# Patient Record
Sex: FEMALE | Race: WHITE | NOT HISPANIC OR LATINO | Employment: FULL TIME | URBAN - METROPOLITAN AREA
[De-identification: names, ages, dates, MRNs, and addresses within clinical notes are randomized per-mention and may not be internally consistent; named-entity substitution may affect disease eponyms.]

---

## 2017-06-15 DIAGNOSIS — F32.A DEPRESSION: ICD-10-CM

## 2017-06-15 RX ORDER — ESCITALOPRAM OXALATE 20 MG/1
TABLET ORAL
Qty: 135 TABLET | Refills: 0 | Status: SHIPPED | OUTPATIENT
Start: 2017-06-15 | End: 2017-09-25 | Stop reason: SDUPTHER

## 2017-06-15 NOTE — TELEPHONE ENCOUNTER
"Tried to inform pt about appt but she hung up on me, mid conversation, after asking me \"who I was and what I wanted.\"  "

## 2017-09-25 ENCOUNTER — OFFICE VISIT (OUTPATIENT)
Dept: FAMILY MEDICINE CLINIC | Facility: CLINIC | Age: 27
End: 2017-09-25

## 2017-09-25 VITALS
BODY MASS INDEX: 52.15 KG/M2 | WEIGHT: 283.4 LBS | TEMPERATURE: 97.2 F | SYSTOLIC BLOOD PRESSURE: 124 MMHG | HEART RATE: 76 BPM | DIASTOLIC BLOOD PRESSURE: 84 MMHG | RESPIRATION RATE: 18 BRPM | HEIGHT: 62 IN

## 2017-09-25 DIAGNOSIS — F33.42 RECURRENT MAJOR DEPRESSIVE DISORDER, IN FULL REMISSION (HCC): Primary | ICD-10-CM

## 2017-09-25 DIAGNOSIS — R63.5 WEIGHT GAIN: ICD-10-CM

## 2017-09-25 DIAGNOSIS — E28.2 PCOS (POLYCYSTIC OVARIAN SYNDROME): ICD-10-CM

## 2017-09-25 LAB
ALBUMIN SERPL-MCNC: 4.2 G/DL (ref 3.2–4.8)
ALBUMIN/GLOB SERPL: 1.6 G/DL (ref 1.5–2.5)
ALP SERPL-CCNC: 79 U/L (ref 25–100)
ALT SERPL-CCNC: 63 U/L (ref 7–40)
AST SERPL-CCNC: 35 U/L (ref 0–33)
BASOPHILS # BLD AUTO: 0.03 10*3/MM3 (ref 0–0.2)
BASOPHILS NFR BLD AUTO: 0.4 % (ref 0–1)
BILIRUB SERPL-MCNC: 0.3 MG/DL (ref 0.3–1.2)
BUN SERPL-MCNC: 9 MG/DL (ref 9–23)
BUN/CREAT SERPL: 12.9 (ref 7–25)
CALCIUM SERPL-MCNC: 9.1 MG/DL (ref 8.7–10.4)
CHLORIDE SERPL-SCNC: 106 MMOL/L (ref 99–109)
CO2 SERPL-SCNC: 26 MMOL/L (ref 20–31)
CREAT SERPL-MCNC: 0.7 MG/DL (ref 0.6–1.3)
EOSINOPHIL # BLD AUTO: 0.35 10*3/MM3 (ref 0–0.3)
EOSINOPHIL NFR BLD AUTO: 4.6 % (ref 0–3)
ERYTHROCYTE [DISTWIDTH] IN BLOOD BY AUTOMATED COUNT: 14.3 % (ref 11.3–14.5)
GLOBULIN SER CALC-MCNC: 2.7 GM/DL
GLUCOSE SERPL-MCNC: 135 MG/DL (ref 70–100)
HBA1C MFR BLD: 5.6 % (ref 4.8–5.6)
HCT VFR BLD AUTO: 41.9 % (ref 34.5–44)
HGB BLD-MCNC: 13.4 G/DL (ref 11.5–15.5)
IMM GRANULOCYTES # BLD: 0.04 10*3/MM3 (ref 0–0.03)
IMM GRANULOCYTES NFR BLD: 0.5 % (ref 0–0.6)
LYMPHOCYTES # BLD AUTO: 3.11 10*3/MM3 (ref 0.6–4.8)
LYMPHOCYTES NFR BLD AUTO: 40.5 % (ref 24–44)
MCH RBC QN AUTO: 28.5 PG (ref 27–31)
MCHC RBC AUTO-ENTMCNC: 32 G/DL (ref 32–36)
MCV RBC AUTO: 89 FL (ref 80–99)
MONOCYTES # BLD AUTO: 0.58 10*3/MM3 (ref 0–1)
MONOCYTES NFR BLD AUTO: 7.6 % (ref 0–12)
NEUTROPHILS # BLD AUTO: 3.56 10*3/MM3 (ref 1.5–8.3)
NEUTROPHILS NFR BLD AUTO: 46.4 % (ref 41–71)
PLATELET # BLD AUTO: 357 10*3/MM3 (ref 150–450)
POTASSIUM SERPL-SCNC: 3.8 MMOL/L (ref 3.5–5.5)
PROT SERPL-MCNC: 6.9 G/DL (ref 5.7–8.2)
RBC # BLD AUTO: 4.71 10*6/MM3 (ref 3.89–5.14)
SODIUM SERPL-SCNC: 139 MMOL/L (ref 132–146)
TSH SERPL DL<=0.005 MIU/L-ACNC: 4.11 MIU/ML (ref 0.35–5.35)
WBC # BLD AUTO: 7.67 10*3/MM3 (ref 3.5–10.8)

## 2017-09-25 PROCEDURE — 99214 OFFICE O/P EST MOD 30 MIN: CPT | Performed by: FAMILY MEDICINE

## 2017-09-25 PROCEDURE — 3008F BODY MASS INDEX DOCD: CPT | Performed by: FAMILY MEDICINE

## 2017-09-25 RX ORDER — ESCITALOPRAM OXALATE 20 MG/1
30 TABLET ORAL DAILY
Qty: 135 TABLET | Refills: 1 | Status: SHIPPED | OUTPATIENT
Start: 2017-09-25 | End: 2017-11-16

## 2017-09-25 NOTE — PROGRESS NOTES
Subjective   Nicole Jo is a 27 y.o. female.     History of Present Illness     Her mood has been doing well  She is using lexapro as well as THC on regular basis which has greatly enhanced her mood  This has been helpful for her and happy with where she is at right now    She forgot to take her to metformin and would lik to get back on her metformin.  Has PCOS  She has been feeling somewhat shaky if she does not eat anything.  This is better with eating  Pt would like labs to make sure she has not developed DM at this time due to family history    She continues to gain weight  She is not sure why she has gained more weight  She is not dieting nor exercising but has also not changed her lifestyle and has gained 14 pounds since last visit.  Mood doing well and THC use could be an issue.    The following portions of the patient's history were reviewed and updated as appropriate: allergies, current medications, past family history, past medical history, past social history, past surgical history and problem list.    Review of Systems   Constitutional: Positive for unexpected weight change.   HENT: Negative.    Eyes: Negative.    Respiratory: Negative.    Cardiovascular: Negative.    Gastrointestinal: Negative.    Musculoskeletal: Negative.    Skin: Negative.    Neurological: Negative.    Psychiatric/Behavioral: Negative.    All other systems reviewed and are negative.      Objective   Physical Exam   Constitutional: She is oriented to person, place, and time. She appears well-developed and well-nourished. No distress.   HENT:   Head: Normocephalic and atraumatic.   Right Ear: Tympanic membrane, external ear and ear canal normal.   Left Ear: Tympanic membrane, external ear and ear canal normal.   Nose: Nose normal.   Mouth/Throat: Uvula is midline and oropharynx is clear and moist.   Eyes: Conjunctivae and EOM are normal.   Neck: Normal range of motion. Neck supple. No thyromegaly present.   Cardiovascular: Normal  rate, regular rhythm and normal heart sounds.    No murmur heard.  Pulmonary/Chest: Effort normal and breath sounds normal. No respiratory distress.   Abdominal: Soft. Bowel sounds are normal. She exhibits no distension and no mass. There is no tenderness.   Lymphadenopathy:     She has no cervical adenopathy.   Neurological: She is alert and oriented to person, place, and time.   Skin: Skin is warm and dry.   Psychiatric: She has a normal mood and affect. Her behavior is normal. Judgment and thought content normal.   Nursing note and vitals reviewed.      Assessment/Plan   Nicole was seen today for polycystic ovary syndrome and depression.    Diagnoses and all orders for this visit:    Recurrent major depressive disorder, in full remission  -     escitalopram (LEXAPRO) 20 MG tablet; Take 1.5 tablets by mouth Daily.    PCOS (polycystic ovarian syndrome)  -     metFORMIN (GLUCOPHAGE) 500 MG tablet; Take 1 tablet by mouth Daily With Breakfast.  -     CBC & Differential  -     Comprehensive Metabolic Panel  -     Hemoglobin A1c    Weight gain  -     CBC & Differential  -     Comprehensive Metabolic Panel  -     Hemoglobin A1c  -     TSH    mood has been doing great.  I did tell her I could not condone illicit substance usage it has provided benefit for her mood, placebo or not.  This could be causing some weight issues.  Will check labs for the weight and continue lexapro unchanged.  Diet and exercise discussed for her weight  Ok to resume metformin for PCOS and will check labs.  F/u pending results

## 2017-11-16 ENCOUNTER — OFFICE VISIT (OUTPATIENT)
Dept: FAMILY MEDICINE CLINIC | Facility: CLINIC | Age: 27
End: 2017-11-16

## 2017-11-16 VITALS
WEIGHT: 288.5 LBS | HEART RATE: 76 BPM | TEMPERATURE: 97.6 F | HEIGHT: 62 IN | DIASTOLIC BLOOD PRESSURE: 72 MMHG | BODY MASS INDEX: 53.09 KG/M2 | RESPIRATION RATE: 18 BRPM | SYSTOLIC BLOOD PRESSURE: 116 MMHG

## 2017-11-16 DIAGNOSIS — F33.8 SEASONAL AFFECTIVE DISORDER (HCC): ICD-10-CM

## 2017-11-16 DIAGNOSIS — F33.42 RECURRENT MAJOR DEPRESSIVE DISORDER, IN FULL REMISSION (HCC): Primary | ICD-10-CM

## 2017-11-16 PROCEDURE — 99213 OFFICE O/P EST LOW 20 MIN: CPT | Performed by: FAMILY MEDICINE

## 2017-11-16 RX ORDER — CITALOPRAM 40 MG/1
40 TABLET ORAL DAILY
Qty: 90 TABLET | Refills: 1 | Status: SHIPPED | OUTPATIENT
Start: 2017-11-16 | End: 2018-02-27 | Stop reason: SDUPTHER

## 2017-11-16 NOTE — PROGRESS NOTES
"Subjective   Nicole Jo is a 27 y.o. female.     History of Present Illness   History of depression currently treated with Lexapro 30mg daily.   States that depression has worsened, typically worsens this time of year. Yesterday, she cried during her lunch and all night last night with no specific reason.  Lexapro improves symptoms, also smokes marijuana which improves depression more.   She denies suicidal thoughts, but \"the thought of not existing, but not dying, is attractive\". No plan or intent of suicide.   Previously treated with Abilify, but made her suicidal.   Took Zoloft as a child, however can't remember how effective it was.     The following portions of the patient's history were reviewed and updated as appropriate: allergies, current medications, past family history, past medical history, past social history, past surgical history and problem list.    Review of Systems   Constitutional: Negative.    Respiratory: Negative.    Cardiovascular: Negative.    Neurological: Negative.    Hematological: Negative.    Psychiatric/Behavioral: Positive for dysphoric mood. Negative for agitation, behavioral problems, self-injury and suicidal ideas. The patient is not nervous/anxious.        Objective   Physical Exam   Constitutional: She is oriented to person, place, and time. She appears well-developed and well-nourished.   HENT:   Head: Normocephalic and atraumatic.   Right Ear: External ear normal.   Left Ear: External ear normal.   Nose: Nose normal.   Eyes: Conjunctivae and EOM are normal.   Neck: Normal range of motion.   Cardiovascular: Normal rate, regular rhythm and normal heart sounds.    Pulmonary/Chest: Effort normal and breath sounds normal.   Musculoskeletal: She exhibits no deformity.   Neurological: She is alert and oriented to person, place, and time. No cranial nerve deficit.   Skin: Skin is warm and dry.   Psychiatric: Her speech is normal and behavior is normal. She exhibits a depressed mood. "   Flat affect   Nursing note and vitals reviewed.      Assessment/Plan   Nicole was seen today for depression.    Diagnoses and all orders for this visit:    Recurrent major depressive disorder, in full remission  -     citalopram (CELEXA) 40 MG tablet; Take 1 tablet by mouth Daily.    Seasonal affective disorder      Likely seasonal affective disorder causing the increase of depression.  Will change therapy to Celexa to see if it will improve symptoms better.  She will notify us of any side effects.  Advised her to avoid marijuana and other illicit substances.  She was instructed to go to the nearest ER if suicidal or homicidal thoughts did become present.

## 2017-11-16 NOTE — PATIENT INSTRUCTIONS
Go to the nearest ER or return to clinic if symptoms worsen, fever/chill develop  Major Depressive Disorder  Major depressive disorder is a mental illness. It also may be called clinical depression or unipolar depression. Major depressive disorder usually causes feelings of sadness, hopelessness, or helplessness. Some people with this disorder do not feel particularly sad but lose interest in doing things they used to enjoy (anhedonia). Major depressive disorder also can cause physical symptoms. It can interfere with work, school, relationships, and other normal everyday activities. The disorder varies in severity but is longer lasting and more serious than the sadness we all feel from time to time in our lives.  Major depressive disorder often is triggered by stressful life events or major life changes. Examples of these triggers include divorce, loss of your job or home, a move, and the death of a family member or close friend. Sometimes this disorder occurs for no obvious reason at all. People who have family members with major depressive disorder or bipolar disorder are at higher risk for developing this disorder, with or without life stressors. Major depressive disorder can occur at any age. It may occur just once in your life (single episode major depressive disorder). It may occur multiple times (recurrent major depressive disorder).  SYMPTOMS  People with major depressive disorder have either anhedonia or depressed mood on nearly a daily basis for at least 2 weeks or longer. Symptoms of depressed mood include:  · Feelings of sadness (blue or down in the dumps) or emptiness.  · Feelings of hopelessness or helplessness.  · Tearfulness or episodes of crying (may be observed by others).  · Irritability (children and adolescents).  In addition to depressed mood or anhedonia or both, people with this disorder have at least four of the following symptoms:  · Difficulty sleeping or sleeping too much.     · Significant change (increase or decrease) in appetite or weight.    · Lack of energy or motivation.  · Feelings of guilt and worthlessness.    · Difficulty concentrating, remembering, or making decisions.  · Unusually slow movement (psychomotor retardation) or restlessness (as observed by others).    · Recurrent wishes for death, recurrent thoughts of self-harm (suicide), or a suicide attempt.  People with major depressive disorder commonly have persistent negative thoughts about themselves, other people, and the world. People with severe major depressive disorder may experience distorted beliefs or perceptions about the world (psychotic delusions). They also may see or hear things that are not real (psychotic hallucinations).  DIAGNOSIS  Major depressive disorder is diagnosed through an assessment by your health care provider. Your health care provider will ask about aspects of your daily life, such as mood, sleep, and appetite, to see if you have the diagnostic symptoms of major depressive disorder. Your health care provider may ask about your medical history and use of alcohol or drugs, including prescription medicines. Your health care provider also may do a physical exam and blood work. This is because certain medical conditions and the use of certain substances can cause major depressive disorder-like symptoms (secondary depression). Your health care provider also may refer you to a mental health specialist for further evaluation and treatment.  TREATMENT  It is important to recognize the symptoms of major depressive disorder and seek treatment. The following treatments can be prescribed for this disorder:    · Medicine. Antidepressant medicines usually are prescribed. Antidepressant medicines are thought to correct chemical imbalances in the brain that are commonly associated with major depressive disorder. Other types of medicine may be added if the symptoms do not respond to antidepressant medicines  alone or if psychotic delusions or hallucinations occur.  · Talk therapy. Talk therapy can be helpful in treating major depressive disorder by providing support, education, and guidance. Certain types of talk therapy also can help with negative thinking (cognitive behavioral therapy) and with relationship issues that trigger this disorder (interpersonal therapy).  A mental health specialist can help determine which treatment is best for you. Most people with major depressive disorder do well with a combination of medicine and talk therapy. Treatments involving electrical stimulation of the brain can be used in situations with extremely severe symptoms or when medicine and talk therapy do not work over time. These treatments include electroconvulsive therapy, transcranial magnetic stimulation, and vagal nerve stimulation.     This information is not intended to replace advice given to you by your health care provider. Make sure you discuss any questions you have with your health care provider.     Document Released: 04/14/2014 Document Revised: 01/08/2016 Document Reviewed: 04/14/2014  [employer] Interactive Patient Education ©2017 [employer]

## 2018-02-27 ENCOUNTER — OFFICE VISIT (OUTPATIENT)
Dept: FAMILY MEDICINE CLINIC | Facility: CLINIC | Age: 28
End: 2018-02-27

## 2018-02-27 VITALS
DIASTOLIC BLOOD PRESSURE: 70 MMHG | HEART RATE: 72 BPM | BODY MASS INDEX: 50.88 KG/M2 | SYSTOLIC BLOOD PRESSURE: 114 MMHG | WEIGHT: 276.5 LBS | RESPIRATION RATE: 18 BRPM | HEIGHT: 62 IN | TEMPERATURE: 97.6 F

## 2018-02-27 DIAGNOSIS — E28.2 PCOS (POLYCYSTIC OVARIAN SYNDROME): ICD-10-CM

## 2018-02-27 DIAGNOSIS — F33.42 RECURRENT MAJOR DEPRESSIVE DISORDER, IN FULL REMISSION (HCC): ICD-10-CM

## 2018-02-27 PROCEDURE — 99214 OFFICE O/P EST MOD 30 MIN: CPT | Performed by: FAMILY MEDICINE

## 2018-02-27 RX ORDER — CITALOPRAM 40 MG/1
40 TABLET ORAL DAILY
Qty: 90 TABLET | Refills: 1 | Status: SHIPPED | OUTPATIENT
Start: 2018-02-27 | End: 2018-07-30 | Stop reason: SDUPTHER

## 2018-02-27 NOTE — PROGRESS NOTES
Subjective   Nicole Jo is a 27 y.o. female.     History of Present Illness     She has been doing better on the celexa instead of lexapro  Better libido on this medicine  And mood has been doing well  She does not feel down nor depressed and her mood is really well controlled    Pt with PCOS but she does forget the metformin often and asks that I send it to [facility] today  She is willing to resume it  She does not feel better nor worse on the meciein but she should be taking it for her PCOS and will resume    reviewed Pmhx, SocHx, Fam Hx, Surg Hx today    Review of Systems   Constitutional: Negative.  Negative for unexpected weight change.   HENT: Negative.    Eyes: Negative.  Negative for visual disturbance.   Respiratory: Negative.  Negative for shortness of breath.    Cardiovascular: Negative.  Negative for chest pain.   Gastrointestinal: Negative.  Negative for constipation and diarrhea.   Genitourinary: Negative.  Negative for menstrual problem.   Musculoskeletal: Negative.    Skin: Negative.    Neurological: Negative.    Psychiatric/Behavioral: Negative.  Negative for dysphoric mood. The patient is not nervous/anxious.    All other systems reviewed and are negative.      Objective   Physical Exam   Constitutional: She is oriented to person, place, and time. She appears well-developed and well-nourished. No distress.   HENT:   Head: Normocephalic and atraumatic.   Cardiovascular: Normal rate, regular rhythm and normal heart sounds.    Pulmonary/Chest: Effort normal and breath sounds normal.   Neurological: She is alert and oriented to person, place, and time.   Psychiatric: She has a normal mood and affect. Her behavior is normal. Judgment and thought content normal.   Nursing note and vitals reviewed.      Assessment/Plan   Nicole was seen today for depression.    Diagnoses and all orders for this visit:    Recurrent major depressive disorder, in full remission  -     citalopram (CELEXA) 40 MG tablet; Take 1  tablet by mouth Daily.    PCOS (polycystic ovarian syndrome)  -     metFORMIN (GLUCOPHAGE) 500 MG tablet; Take 1 tablet by mouth Daily With Breakfast.    she really is doing quite well, happy with medicine and things at home are going well  Refilled celexa  She has not been taking the metformin, will refill it and she will resume.  On this for PCOS without issues.  Did review old labs, plan to recheck in 6 months

## 2018-07-30 ENCOUNTER — OFFICE VISIT (OUTPATIENT)
Dept: FAMILY MEDICINE CLINIC | Facility: CLINIC | Age: 28
End: 2018-07-30

## 2018-07-30 VITALS
DIASTOLIC BLOOD PRESSURE: 74 MMHG | HEIGHT: 62 IN | SYSTOLIC BLOOD PRESSURE: 118 MMHG | BODY MASS INDEX: 49.32 KG/M2 | HEART RATE: 76 BPM | RESPIRATION RATE: 18 BRPM | TEMPERATURE: 98.4 F | WEIGHT: 268 LBS

## 2018-07-30 DIAGNOSIS — F33.1 MODERATE EPISODE OF RECURRENT MAJOR DEPRESSIVE DISORDER (HCC): Primary | ICD-10-CM

## 2018-07-30 DIAGNOSIS — E28.2 PCOS (POLYCYSTIC OVARIAN SYNDROME): ICD-10-CM

## 2018-07-30 DIAGNOSIS — F33.42 RECURRENT MAJOR DEPRESSIVE DISORDER, IN FULL REMISSION (HCC): ICD-10-CM

## 2018-07-30 PROCEDURE — 99213 OFFICE O/P EST LOW 20 MIN: CPT | Performed by: FAMILY MEDICINE

## 2018-07-30 RX ORDER — BUPROPION HYDROCHLORIDE 150 MG/1
TABLET, EXTENDED RELEASE ORAL
Qty: 60 TABLET | Refills: 2 | Status: SHIPPED | OUTPATIENT
Start: 2018-07-30 | End: 2018-11-27 | Stop reason: SDUPTHER

## 2018-07-30 RX ORDER — CITALOPRAM 40 MG/1
40 TABLET ORAL DAILY
Qty: 90 TABLET | Refills: 1 | Status: SHIPPED | OUTPATIENT
Start: 2018-07-30 | End: 2019-01-07 | Stop reason: SDUPTHER

## 2018-07-30 NOTE — PROGRESS NOTES
Subjective   Nicole Jo is a 27 y.o. female.     History of Present Illness     Last week she had 2 really bad days where she could not leave the house for work due to her poor mood   recently diagnosed with acid/GI/genetic health issue  Crying more often the last week  No SI nor HI at this time    Counseling helped a little in the past but was very expensive      Review of Systems   Constitutional: Negative.    Psychiatric/Behavioral: Positive for dysphoric mood. Negative for self-injury and suicidal ideas.       Objective   Physical Exam   Constitutional: She appears well-developed and well-nourished. No distress.   Cardiovascular: Normal rate, regular rhythm and normal heart sounds.    Pulmonary/Chest: Effort normal and breath sounds normal.   Psychiatric: She has a normal mood and affect. Her behavior is normal.   Nursing note and vitals reviewed.      Assessment/Plan   Nicole was seen today for depression.    Diagnoses and all orders for this visit:    Moderate episode of recurrent major depressive disorder (CMS/HCC)  -     buPROPion SR (WELLBUTRIN SR) 150 MG 12 hr tablet; 1 po QAM 1 week then 1 PO BID    Recurrent major depressive disorder, in full remission (CMS/HCC)  -     buPROPion SR (WELLBUTRIN SR) 150 MG 12 hr tablet; 1 po QAM 1 week then 1 PO BID  -     citalopram (CELEXA) 40 MG tablet; Take 1 tablet by mouth Daily.    PCOS (polycystic ovarian syndrome)  -     metFORMIN (GLUCOPHAGE) 500 MG tablet; Take 1 tablet by mouth Daily With Breakfast.    mood is worse, continue celexa and add wellbutrin to regimen.  Pros/cons/SE discussed with pt, she will call in one month and I will see her in 2 months.  She agrees  No change in metformin

## 2018-11-27 DIAGNOSIS — F33.42 RECURRENT MAJOR DEPRESSIVE DISORDER, IN FULL REMISSION (HCC): ICD-10-CM

## 2018-11-27 DIAGNOSIS — F33.1 MODERATE EPISODE OF RECURRENT MAJOR DEPRESSIVE DISORDER (HCC): ICD-10-CM

## 2018-11-28 RX ORDER — BUPROPION HYDROCHLORIDE 150 MG/1
TABLET, EXTENDED RELEASE ORAL
Qty: 60 TABLET | Refills: 0 | Status: SHIPPED | OUTPATIENT
Start: 2018-11-28 | End: 2019-01-04 | Stop reason: SDUPTHER

## 2019-01-04 DIAGNOSIS — F33.1 MODERATE EPISODE OF RECURRENT MAJOR DEPRESSIVE DISORDER (HCC): ICD-10-CM

## 2019-01-04 DIAGNOSIS — F33.42 RECURRENT MAJOR DEPRESSIVE DISORDER, IN FULL REMISSION (HCC): ICD-10-CM

## 2019-01-04 RX ORDER — BUPROPION HYDROCHLORIDE 150 MG/1
150 TABLET, EXTENDED RELEASE ORAL 2 TIMES DAILY
Qty: 60 TABLET | Refills: 0 | Status: SHIPPED | OUTPATIENT
Start: 2019-01-04 | End: 2019-01-07 | Stop reason: SDUPTHER

## 2019-01-07 ENCOUNTER — OFFICE VISIT (OUTPATIENT)
Dept: FAMILY MEDICINE CLINIC | Facility: CLINIC | Age: 29
End: 2019-01-07

## 2019-01-07 VITALS
SYSTOLIC BLOOD PRESSURE: 130 MMHG | RESPIRATION RATE: 16 BRPM | TEMPERATURE: 98 F | BODY MASS INDEX: 46.01 KG/M2 | DIASTOLIC BLOOD PRESSURE: 94 MMHG | WEIGHT: 250 LBS | HEART RATE: 72 BPM | HEIGHT: 62 IN

## 2019-01-07 DIAGNOSIS — E28.2 PCOS (POLYCYSTIC OVARIAN SYNDROME): ICD-10-CM

## 2019-01-07 DIAGNOSIS — J40 BRONCHITIS: ICD-10-CM

## 2019-01-07 DIAGNOSIS — G43.709 CHRONIC MIGRAINE WITHOUT AURA WITHOUT STATUS MIGRAINOSUS, NOT INTRACTABLE: Primary | ICD-10-CM

## 2019-01-07 DIAGNOSIS — F33.42 RECURRENT MAJOR DEPRESSIVE DISORDER, IN FULL REMISSION (HCC): ICD-10-CM

## 2019-01-07 DIAGNOSIS — F33.1 MODERATE EPISODE OF RECURRENT MAJOR DEPRESSIVE DISORDER (HCC): ICD-10-CM

## 2019-01-07 PROCEDURE — 99214 OFFICE O/P EST MOD 30 MIN: CPT | Performed by: FAMILY MEDICINE

## 2019-01-07 RX ORDER — CITALOPRAM 40 MG/1
40 TABLET ORAL DAILY
Qty: 90 TABLET | Refills: 1 | Status: SHIPPED | OUTPATIENT
Start: 2019-01-07

## 2019-01-07 RX ORDER — BUPROPION HYDROCHLORIDE 150 MG/1
150 TABLET, EXTENDED RELEASE ORAL 2 TIMES DAILY
Qty: 60 TABLET | Refills: 0 | Status: SHIPPED | OUTPATIENT
Start: 2019-01-07 | End: 2019-03-22 | Stop reason: SDUPTHER

## 2019-01-07 RX ORDER — RIZATRIPTAN BENZOATE 10 MG/1
10 TABLET ORAL ONCE AS NEEDED
Qty: 9 TABLET | Refills: 5 | Status: SHIPPED | OUTPATIENT
Start: 2019-01-07

## 2019-01-07 RX ORDER — PREDNISONE 20 MG/1
40 TABLET ORAL DAILY
Qty: 10 TABLET | Refills: 0 | Status: SHIPPED | OUTPATIENT
Start: 2019-01-07

## 2019-01-07 NOTE — PROGRESS NOTES
Subjective   Nicole Jo is a 28 y.o. female.     History of Present Illness     For the last few months she has been having more headaches  Worse with change in the weather and then worse around her cycle  She has been documenting them  Can be pain and pressure and then spread over her whole head  Sometimes movement makes these worse  Alleviating: fioricet from her dad.   She has missed work due to migraines    She has been ill for a while  Cough and congestion     She had not had her maxalt as she had not needed it for a while    Mood has been doing ok, the winter is already harder on her mood  Still taking her wellbutrin and celexa  Mood is stable    She is still on her metformin for PCOS  No complaints about this  She does need refills    The following portions of the patient's history were reviewed and updated as appropriate: allergies, current medications, past family history, past medical history, past social history, past surgical history and problem list.    Review of Systems   Constitutional: Negative.    Eyes: Negative.  Negative for visual disturbance.   Respiratory: Positive for cough. Negative for shortness of breath.    Cardiovascular: Negative.  Negative for chest pain.   Gastrointestinal: Negative.  Negative for constipation and diarrhea.   Musculoskeletal: Negative.    Skin: Negative.    Neurological: Positive for headaches.   Psychiatric/Behavioral: Negative.    All other systems reviewed and are negative.      Objective   Physical Exam   Constitutional: She is oriented to person, place, and time. She appears well-developed and well-nourished. No distress.   HENT:   Head: Normocephalic and atraumatic.   Right Ear: Tympanic membrane, external ear and ear canal normal.   Left Ear: Tympanic membrane, external ear and ear canal normal.   Nose: Nose normal.   Mouth/Throat: Uvula is midline and oropharynx is clear and moist.   Eyes: Conjunctivae and EOM are normal. Pupils are equal, round, and  reactive to light.   Neck: Normal range of motion. Neck supple. No thyromegaly present.   Cardiovascular: Normal rate, regular rhythm and normal heart sounds.   No murmur heard.  Pulmonary/Chest: Effort normal. No respiratory distress. She has wheezes.   Abdominal: Soft. Bowel sounds are normal. She exhibits no distension and no mass. There is no tenderness.   Lymphadenopathy:     She has no cervical adenopathy.   Neurological: She is alert and oriented to person, place, and time.   Skin: Skin is warm and dry.   Psychiatric: She has a normal mood and affect. Her behavior is normal. Judgment and thought content normal.   Nursing note and vitals reviewed.      Assessment/Plan   Nicole was seen today for uri and headache.    Diagnoses and all orders for this visit:    Chronic migraine without aura without status migrainosus, not intractable  -     rizatriptan (MAXALT) 10 MG tablet; Take 1 tablet by mouth 1 (One) Time As Needed for Migraine for up to 1 dose. May repeat in 2 hours if needed  -     predniSONE (DELTASONE) 20 MG tablet; Take 2 tablets by mouth Daily.    Moderate episode of recurrent major depressive disorder (CMS/HCC)  -     buPROPion SR (WELLBUTRIN SR) 150 MG 12 hr tablet; Take 1 tablet by mouth 2 (Two) Times a Day. Pt needs appt    Recurrent major depressive disorder, in full remission (CMS/HCC)  -     buPROPion SR (WELLBUTRIN SR) 150 MG 12 hr tablet; Take 1 tablet by mouth 2 (Two) Times a Day. Pt needs appt  -     citalopram (CELEXA) 40 MG tablet; Take 1 tablet by mouth Daily.    PCOS (polycystic ovarian syndrome)  -     metFORMIN (GLUCOPHAGE) 500 MG tablet; Take 1 tablet by mouth Daily With Breakfast.    Bronchitis  -     predniSONE (DELTASONE) 20 MG tablet; Take 2 tablets by mouth Daily.    will treat with pred burst and then use maxalt PRN.  Consider imaging if this worsens as well as daily medicine.  Pt will RTO in one month INB  Mood doing well with wellbutrin and celexa  Ok pred for wheezing in  lungs, call back INB  Metformin refilled

## 2019-03-22 DIAGNOSIS — F33.1 MODERATE EPISODE OF RECURRENT MAJOR DEPRESSIVE DISORDER (HCC): ICD-10-CM

## 2019-03-22 DIAGNOSIS — F33.42 RECURRENT MAJOR DEPRESSIVE DISORDER, IN FULL REMISSION (HCC): ICD-10-CM

## 2019-03-22 RX ORDER — BUPROPION HYDROCHLORIDE 150 MG/1
TABLET, EXTENDED RELEASE ORAL
Qty: 60 TABLET | Refills: 2 | Status: SHIPPED | OUTPATIENT
Start: 2019-03-22

## 2019-08-07 DIAGNOSIS — E28.2 PCOS (POLYCYSTIC OVARIAN SYNDROME): ICD-10-CM

## 2019-10-16 DIAGNOSIS — F33.42 RECURRENT MAJOR DEPRESSIVE DISORDER, IN FULL REMISSION (HCC): ICD-10-CM

## 2019-10-16 DIAGNOSIS — F33.1 MODERATE EPISODE OF RECURRENT MAJOR DEPRESSIVE DISORDER (HCC): ICD-10-CM

## 2019-10-16 DIAGNOSIS — E28.2 PCOS (POLYCYSTIC OVARIAN SYNDROME): ICD-10-CM

## 2019-10-31 ENCOUNTER — TELEPHONE (OUTPATIENT)
Dept: FAMILY MEDICINE CLINIC | Facility: CLINIC | Age: 29
End: 2019-10-31

## 2019-11-01 RX ORDER — BUPROPION HYDROCHLORIDE 150 MG/1
TABLET, EXTENDED RELEASE ORAL
Qty: 60 TABLET | Refills: 2 | OUTPATIENT
Start: 2019-11-01

## 2019-11-12 DIAGNOSIS — F33.42 RECURRENT MAJOR DEPRESSIVE DISORDER, IN FULL REMISSION (HCC): ICD-10-CM

## 2019-11-12 DIAGNOSIS — F33.1 MODERATE EPISODE OF RECURRENT MAJOR DEPRESSIVE DISORDER (HCC): ICD-10-CM

## 2019-11-12 RX ORDER — BUPROPION HYDROCHLORIDE 150 MG/1
TABLET, EXTENDED RELEASE ORAL
Qty: 60 TABLET | Refills: 2 | OUTPATIENT
Start: 2019-11-12

## 2023-08-21 ENCOUNTER — OFFICE VISIT (OUTPATIENT)
Dept: FAMILY MEDICINE CLINIC | Facility: CLINIC | Age: 33
End: 2023-08-21
Payer: MEDICAID

## 2023-08-21 ENCOUNTER — LAB (OUTPATIENT)
Dept: LAB | Facility: HOSPITAL | Age: 33
End: 2023-08-21
Payer: MEDICAID

## 2023-08-21 VITALS
HEIGHT: 63 IN | WEIGHT: 256 LBS | DIASTOLIC BLOOD PRESSURE: 88 MMHG | TEMPERATURE: 98.6 F | BODY MASS INDEX: 45.36 KG/M2 | SYSTOLIC BLOOD PRESSURE: 132 MMHG | HEART RATE: 84 BPM

## 2023-08-21 DIAGNOSIS — M79.10 MYALGIA: ICD-10-CM

## 2023-08-21 DIAGNOSIS — G43.709 CHRONIC MIGRAINE WITHOUT AURA WITHOUT STATUS MIGRAINOSUS, NOT INTRACTABLE: ICD-10-CM

## 2023-08-21 DIAGNOSIS — G89.29 CHRONIC PAIN OF MULTIPLE JOINTS: ICD-10-CM

## 2023-08-21 DIAGNOSIS — M25.50 CHRONIC PAIN OF MULTIPLE JOINTS: Primary | ICD-10-CM

## 2023-08-21 DIAGNOSIS — M25.50 CHRONIC PAIN OF MULTIPLE JOINTS: ICD-10-CM

## 2023-08-21 DIAGNOSIS — G89.29 CHRONIC PAIN OF MULTIPLE JOINTS: Primary | ICD-10-CM

## 2023-08-21 PROBLEM — E66.01 CLASS 3 SEVERE OBESITY DUE TO EXCESS CALORIES WITHOUT SERIOUS COMORBIDITY WITH BODY MASS INDEX (BMI) OF 45.0 TO 49.9 IN ADULT: Status: ACTIVE | Noted: 2023-08-21

## 2023-08-21 PROBLEM — E66.813 CLASS 3 SEVERE OBESITY DUE TO EXCESS CALORIES WITHOUT SERIOUS COMORBIDITY WITH BODY MASS INDEX (BMI) OF 45.0 TO 49.9 IN ADULT: Status: ACTIVE | Noted: 2023-08-21

## 2023-08-21 PROBLEM — J30.89 ENVIRONMENTAL AND SEASONAL ALLERGIES: Status: ACTIVE | Noted: 2023-08-21

## 2023-08-21 PROCEDURE — 99204 OFFICE O/P NEW MOD 45 MIN: CPT | Performed by: FAMILY MEDICINE

## 2023-08-21 RX ORDER — CHOLECALCIFEROL (VITAMIN D3) 125 MCG
5 CAPSULE ORAL
COMMUNITY

## 2023-08-21 RX ORDER — LORATADINE 10 MG/1
CAPSULE, LIQUID FILLED ORAL
COMMUNITY

## 2023-08-21 RX ORDER — AMOXICILLIN AND CLAVULANATE POTASSIUM 875; 125 MG/1; MG/1
1 TABLET, FILM COATED ORAL 2 TIMES DAILY
COMMUNITY
End: 2023-08-21

## 2023-08-21 RX ORDER — RIZATRIPTAN BENZOATE 10 MG/1
10 TABLET ORAL ONCE AS NEEDED
Qty: 9 TABLET | Refills: 5 | Status: SHIPPED | OUTPATIENT
Start: 2023-08-21

## 2023-08-21 RX ORDER — TIZANIDINE 2 MG/1
2 TABLET ORAL EVERY 8 HOURS PRN
Qty: 90 TABLET | Refills: 3 | Status: SHIPPED | OUTPATIENT
Start: 2023-08-21

## 2023-08-21 RX ORDER — ESCITALOPRAM OXALATE 10 MG/1
10 TABLET ORAL DAILY
COMMUNITY

## 2023-08-21 RX ORDER — DICLOFENAC SODIUM 75 MG/1
75 TABLET, DELAYED RELEASE ORAL 2 TIMES DAILY
Qty: 180 TABLET | Refills: 1 | Status: SHIPPED | OUTPATIENT
Start: 2023-08-21

## 2023-08-21 RX ORDER — PROPRANOLOL HYDROCHLORIDE 10 MG/1
10 TABLET ORAL 2 TIMES DAILY
COMMUNITY

## 2023-08-21 NOTE — ASSESSMENT & PLAN NOTE
Migraines are chronic and stable.  They worsen around her periods.  She does get relief of symptoms with Maxalt.  She will continue this for the time being.  Refill was given.

## 2023-08-21 NOTE — ASSESSMENT & PLAN NOTE
Patient has chronic pain in multiple joints.  She is tender to palpation in shoulders, knees and pretty much anywhere she is touch.  She also has pain with range of motion but no restrictions of range of motion.  I suspect she has rheumatological disorder or fibromyalgia playing a role in her symptoms.  We will do blood work to evaluate for causes of pain.  Patient will discontinue ibuprofen and naproxen.  She will take Tylenol as needed for pain in addition to diclofenac twice daily.  She will use tizanidine as needed for muscle pain and spasm.  RTC/ED precautions given.  Patient will follow-up in 3 months.   Alert-The patient is alert, awake and responds to voice. The patient is oriented to time, place, and person. The triage nurse is able to obtain subjective information.

## 2023-08-21 NOTE — PROGRESS NOTES
Nicole Jo is a 32 y.o. female who presents today to establish care.    Chief Complaint   Patient presents with    Establish Care     Chronic pain treatment        Patient is here to establish care. She has not had PCP in about 6 years. She is taking melatonin for insomnia, lexapro for depression, propranolol for anxiety, and rizatriptan for migraines which does improve the headaches. She uses THC vape for pain control currently as well as for anxiety. She has chronic pain in joints and muscles for the last three years. She reports pain in all muscles and joints at different times. She has taken ibuprofen and tylenol for pain which does help for a few hours but does not give her lasting relief. She has also taken naproxen in the past which helped some. Pain is worse when she is more active. She also gets worsening of pain when she drives long distances. She feels like she has to sit reclined to get relief but a lot of times she has to go lay day. She sees her fingers swell occasionally but nothing consistent. She even has pain at times from tight fitting clothing.        Review of Systems   Constitutional:  Negative for fever and unexpected weight loss.   HENT:  Negative for congestion, ear pain and sore throat.    Eyes:  Negative for visual disturbance.   Respiratory:  Negative for cough, shortness of breath and wheezing.    Cardiovascular:  Negative for chest pain and palpitations.   Gastrointestinal:  Negative for abdominal pain, blood in stool, constipation, diarrhea, nausea, vomiting and GERD.   Endocrine: Negative for polydipsia and polyuria.   Genitourinary:  Negative for difficulty urinating.   Musculoskeletal:  Positive for arthralgias and myalgias. Negative for joint swelling.   Skin:  Negative for rash and skin lesions.   Allergic/Immunologic: Negative for environmental allergies.   Neurological:  Positive for headache. Negative for seizures and syncope.   Hematological:  Does not bruise/bleed easily.    Psychiatric/Behavioral:  Negative for suicidal ideas.           8/21/2023     1:58 PM   PHQ-2/PHQ-9 Depression Screening   Little Interest or Pleasure in Doing Things 0-->not at all   Feeling Down, Depressed or Hopeless 0-->not at all   PHQ-9: Brief Depression Severity Measure Score 0       Past Medical History:   Diagnosis Date    Acute pharyngitis     Depression     Eustachian tube dysfunction     Insomnia     Joint pain, knee     Migraine     Premenstrual dysphoric disorder     Upper respiratory infection     Weight gain         Past Surgical History:   Procedure Laterality Date    SALPINGECTOMY Bilateral     TONSILLECTOMY      WISDOM TOOTH EXTRACTION          Family History   Problem Relation Age of Onset    Bipolar disorder Mother     Arthritis Mother     Anxiety disorder Mother     Depression Mother     Irritable bowel syndrome Mother     Endometriosis Mother     LOGAN disease Mother     Depression Father     Hypertension Father     Hyperlipidemia Father     Arthritis Father     ADD / ADHD Brother     Bipolar disorder Brother     Hepatitis Brother         C    Drug abuse Brother     ADD / ADHD Brother     Drug abuse Brother     Transient ischemic attack Maternal Grandmother     Stroke Maternal Grandmother     Hypertension Maternal Grandmother     Dementia Maternal Grandmother     Osteoporosis Maternal Grandmother     Lymphoma Maternal Grandfather     Meniere's disease Maternal Grandfather     Arthritis Paternal Grandmother     Stroke Paternal Grandmother     Hypertension Paternal Grandmother     Other Paternal Grandmother         blood clot unknown site    Pancreatic cancer Paternal Grandfather     Hypertension Paternal Grandfather     Hyperlipidemia Paternal Grandfather     Diverticulosis Paternal Grandfather         Social History     Socioeconomic History    Marital status:    Tobacco Use    Smoking status: Never     Passive exposure: Never    Smokeless tobacco: Never   Vaping Use    Vaping Use:  "Every day    Substances: THC (3 gram)    Devices: Disposable    Passive vaping exposure: Yes   Substance and Sexual Activity    Alcohol use: Yes     Comment: very rare    Drug use: Yes     Types: Marijuana     Comment: THC vape    Sexual activity: Yes     Partners: Male     Birth control/protection: Nexplanon     Comment: 1 male partner in the last year        No obstetric history on file. Patient's last menstrual period was 08/01/2023 (exact date).    Current Outpatient Medications on File Prior to Visit   Medication Sig Dispense Refill    escitalopram (LEXAPRO) 10 MG tablet Take 1 tablet by mouth Daily.      Loratadine 10 MG capsule Take  by mouth.      melatonin 5 MG tablet tablet Take 1 tablet by mouth.      propranolol (INDERAL) 10 MG tablet Take 1 tablet by mouth 2 (Two) Times a Day.      [DISCONTINUED] amoxicillin-clavulanate (AUGMENTIN) 875-125 MG per tablet Take 1 tablet by mouth 2 (Two) Times a Day.      [DISCONTINUED] rizatriptan (MAXALT) 10 MG tablet Take 1 tablet by mouth 1 (One) Time As Needed for Migraine for up to 1 dose. May repeat in 2 hours if needed 9 tablet 5    [DISCONTINUED] buPROPion SR (WELLBUTRIN SR) 150 MG 12 hr tablet TAKE 1 TABLET BY MOUTH TWICE DAILY **PATIENT NEEDS APPOINTMENT** (Patient not taking: Reported on 8/21/2023) 60 tablet 2    [DISCONTINUED] citalopram (CELEXA) 40 MG tablet Take 1 tablet by mouth Daily. (Patient not taking: Reported on 8/21/2023) 90 tablet 1    [DISCONTINUED] metFORMIN (GLUCOPHAGE) 500 MG tablet Take 1 tablet by mouth Daily With Breakfast. (Patient not taking: Reported on 8/21/2023) 180 tablet 1    [DISCONTINUED] predniSONE (DELTASONE) 20 MG tablet Take 2 tablets by mouth Daily. (Patient not taking: Reported on 8/21/2023) 10 tablet 0     No current facility-administered medications on file prior to visit.       Allergies   Allergen Reactions    Cephalexin         Visit Vitals  /88   Pulse 84   Temp 98.6 øF (37 øC) (Infrared)   Ht 160 cm (62.99\")   Wt " 116 kg (256 lb)   LMP 08/01/2023 (Exact Date)   BMI 45.36 kg/mý        Physical Exam  Constitutional:       General: She is not in acute distress.     Appearance: She is well-developed. She is not diaphoretic.   HENT:      Head: Atraumatic.   Cardiovascular:      Rate and Rhythm: Normal rate and regular rhythm.      Heart sounds: Normal heart sounds. No murmur heard.    No friction rub. No gallop.   Pulmonary:      Effort: Pulmonary effort is normal. No respiratory distress.      Breath sounds: Normal breath sounds. No stridor. No wheezing, rhonchi or rales.   Abdominal:      General: Bowel sounds are normal. There is no distension.      Palpations: Abdomen is soft. There is no mass.      Tenderness: There is no abdominal tenderness. There is no guarding or rebound.      Hernia: No hernia is present.   Musculoskeletal:      Right shoulder: Tenderness and bony tenderness present. No swelling, deformity, effusion, laceration or crepitus. Normal range of motion. Normal strength.      Left shoulder: Tenderness and bony tenderness present. No swelling, deformity, effusion, laceration or crepitus. Normal range of motion. Normal strength.      Cervical back: Normal range of motion and neck supple. Spasms and tenderness present. No swelling, edema, deformity, erythema, signs of trauma, lacerations, rigidity, torticollis, bony tenderness or crepitus. Pain with movement present. Normal range of motion.      Thoracic back: Spasms and tenderness present. No swelling, edema, deformity, signs of trauma, lacerations or bony tenderness. Normal range of motion. No scoliosis.      Lumbar back: Spasms and tenderness present. No swelling, edema, deformity, signs of trauma, lacerations or bony tenderness. Normal range of motion. Negative right straight leg raise test and negative left straight leg raise test. No scoliosis.      Right knee: Bony tenderness present. No swelling, deformity, effusion, erythema, ecchymosis, lacerations or  crepitus. Normal range of motion. Tenderness present over the medial joint line, lateral joint line, MCL, LCL and patellar tendon. No LCL laxity, MCL laxity, ACL laxity or PCL laxity. Normal alignment, normal meniscus and normal patellar mobility.      Instability Tests: Anterior drawer test negative. Posterior drawer test negative.      Left knee: Bony tenderness present. No swelling, deformity, effusion, erythema, ecchymosis, lacerations or crepitus. Normal range of motion. Tenderness present over the medial joint line, lateral joint line, MCL, LCL and patellar tendon. No LCL laxity, MCL laxity, ACL laxity or PCL laxity.Normal alignment, normal meniscus and normal patellar mobility.      Instability Tests: Anterior drawer test negative. Posterior drawer test negative.   Skin:     General: Skin is warm and dry.   Neurological:      Mental Status: She is alert and oriented to person, place, and time.   Psychiatric:         Behavior: Behavior normal.        Results for orders placed or performed in visit on 09/25/17   Comprehensive Metabolic Panel    Specimen: Blood   Result Value Ref Range    Glucose 135 (H) 70 - 100 mg/dL    BUN 9 9 - 23 mg/dL    Creatinine 0.70 0.60 - 1.30 mg/dL    eGFR Non African Am 100 >60 mL/min/1.73    eGFR African Am 121 >60 mL/min/1.73    BUN/Creatinine Ratio 12.9 7.0 - 25.0    Sodium 139 132 - 146 mmol/L    Potassium 3.8 3.5 - 5.5 mmol/L    Chloride 106 99 - 109 mmol/L    Total CO2 26.0 20.0 - 31.0 mmol/L    Calcium 9.1 8.7 - 10.4 mg/dL    Total Protein 6.9 5.7 - 8.2 g/dL    Albumin 4.20 3.20 - 4.80 g/dL    Globulin 2.7 gm/dL    A/G Ratio 1.6 1.5 - 2.5 g/dL    Total Bilirubin 0.3 0.3 - 1.2 mg/dL    Alkaline Phosphatase 79 25 - 100 U/L    AST (SGOT) 35 (H) 0 - 33 U/L    ALT (SGPT) 63 (H) 7 - 40 U/L   Hemoglobin A1c    Specimen: Blood   Result Value Ref Range    Hemoglobin A1C 5.60 4.80 - 5.60 %   TSH    Specimen: Blood   Result Value Ref Range    TSH 4.107 0.350 - 5.350 mIU/mL   CBC &  Differential    Specimen: Blood   Result Value Ref Range    WBC 7.67 3.50 - 10.80 10*3/mm3    RBC 4.71 3.89 - 5.14 10*6/mm3    Hemoglobin 13.4 11.5 - 15.5 g/dL    Hematocrit 41.9 34.5 - 44.0 %    MCV 89.0 80.0 - 99.0 fL    MCH 28.5 27.0 - 31.0 pg    MCHC 32.0 32.0 - 36.0 g/dL    RDW 14.3 11.3 - 14.5 %    Platelets 357 150 - 450 10*3/mm3    Neutrophil Rel % 46.4 41.0 - 71.0 %    Lymphocyte Rel % 40.5 24.0 - 44.0 %    Monocyte Rel % 7.6 0.0 - 12.0 %    Eosinophil Rel % 4.6 (H) 0.0 - 3.0 %    Basophil Rel % 0.4 0.0 - 1.0 %    Neutrophils Absolute 3.56 1.50 - 8.30 10*3/mm3    Lymphocytes Absolute 3.11 0.60 - 4.80 10*3/mm3    Monocytes Absolute 0.58 0.00 - 1.00 10*3/mm3    Eosinophils Absolute 0.35 (H) 0.00 - 0.30 10*3/mm3    Basophils Absolute 0.03 0.00 - 0.20 10*3/mm3    Immature Granulocyte Rel % 0.5 0.0 - 0.6 %    Immature Grans Absolute 0.04 (H) 0.00 - 0.03 10*3/mm3        Problems Addressed this Visit          Musculoskeletal and Injuries    Chronic pain of multiple joints - Primary     Patient has chronic pain in multiple joints.  She is tender to palpation in shoulders, knees and pretty much anywhere she is touch.  She also has pain with range of motion but no restrictions of range of motion.  I suspect she has rheumatological disorder or fibromyalgia playing a role in her symptoms.  We will do blood work to evaluate for causes of pain.  Patient will discontinue ibuprofen and naproxen.  She will take Tylenol as needed for pain in addition to diclofenac twice daily.  She will use tizanidine as needed for muscle pain and spasm.  RTC/ED precautions given.  Patient will follow-up in 3 months.         Relevant Medications    diclofenac (VOLTAREN) 75 MG EC tablet    Other Relevant Orders    Comprehensive Metabolic Panel    TSH Rfx On Abnormal To Free T4    CBC & Differential    Hemoglobin A1c    JEFF Direct Reflex to 11 Biomarker    C-reactive protein    CK    Uric acid    Rheumatoid Factor    Myalgia    Relevant  Medications    tiZANidine (ZANAFLEX) 2 MG tablet    Other Relevant Orders    Comprehensive Metabolic Panel    TSH Rfx On Abnormal To Free T4    CBC & Differential    Hemoglobin A1c    JEFF Direct Reflex to 11 Biomarker    C-reactive protein    CK    Uric acid    Rheumatoid Factor       Neuro    Migraine     Migraines are chronic and stable.  They worsen around her periods.  She does get relief of symptoms with Maxalt.  She will continue this for the time being.  Refill was given.         Relevant Medications    propranolol (INDERAL) 10 MG tablet    escitalopram (LEXAPRO) 10 MG tablet    rizatriptan (Maxalt) 10 MG tablet    diclofenac (VOLTAREN) 75 MG EC tablet    tiZANidine (ZANAFLEX) 2 MG tablet     Diagnoses         Codes Comments    Chronic pain of multiple joints    -  Primary ICD-10-CM: M25.50, G89.29  ICD-9-CM: 719.49, 338.29     Chronic migraine without aura without status migrainosus, not intractable     ICD-10-CM: G43.709  ICD-9-CM: 346.70     Myalgia     ICD-10-CM: M79.10  ICD-9-CM: 729.1             Return in about 3 months (around 11/21/2023) for Follow-up chronic pain, and migraines.    Filipe Hernandez MD   8/21/2023

## 2023-08-22 LAB
ALBUMIN SERPL-MCNC: 4.5 G/DL (ref 3.5–5.2)
ALBUMIN/GLOB SERPL: 1.8 G/DL
ALP SERPL-CCNC: 72 U/L (ref 39–117)
ALT SERPL-CCNC: 32 U/L (ref 1–33)
ANA SER QL: NEGATIVE
AST SERPL-CCNC: 25 U/L (ref 1–32)
BASOPHILS # BLD AUTO: 0.05 10*3/MM3 (ref 0–0.2)
BASOPHILS NFR BLD AUTO: 0.7 % (ref 0–1.5)
BILIRUB SERPL-MCNC: 0.2 MG/DL (ref 0–1.2)
BUN SERPL-MCNC: 10 MG/DL (ref 6–20)
BUN/CREAT SERPL: 12.7 (ref 7–25)
CALCIUM SERPL-MCNC: 9.5 MG/DL (ref 8.6–10.5)
CHLORIDE SERPL-SCNC: 103 MMOL/L (ref 98–107)
CK SERPL-CCNC: 82 U/L (ref 20–180)
CO2 SERPL-SCNC: 24.9 MMOL/L (ref 22–29)
CREAT SERPL-MCNC: 0.79 MG/DL (ref 0.57–1)
CRP SERPL-MCNC: <0.3 MG/DL (ref 0–0.5)
EGFRCR SERPLBLD CKD-EPI 2021: 101.4 ML/MIN/1.73
EOSINOPHIL # BLD AUTO: 0.3 10*3/MM3 (ref 0–0.4)
EOSINOPHIL NFR BLD AUTO: 3.9 % (ref 0.3–6.2)
ERYTHROCYTE [DISTWIDTH] IN BLOOD BY AUTOMATED COUNT: 13.9 % (ref 12.3–15.4)
GLOBULIN SER CALC-MCNC: 2.5 GM/DL
GLUCOSE SERPL-MCNC: 98 MG/DL (ref 65–99)
HBA1C MFR BLD: 5.6 % (ref 4.8–5.6)
HCT VFR BLD AUTO: 40 % (ref 34–46.6)
HGB BLD-MCNC: 13.3 G/DL (ref 12–15.9)
IMM GRANULOCYTES # BLD AUTO: 0.07 10*3/MM3 (ref 0–0.05)
IMM GRANULOCYTES NFR BLD AUTO: 0.9 % (ref 0–0.5)
LYMPHOCYTES # BLD AUTO: 2.15 10*3/MM3 (ref 0.7–3.1)
LYMPHOCYTES NFR BLD AUTO: 28.1 % (ref 19.6–45.3)
MCH RBC QN AUTO: 28.7 PG (ref 26.6–33)
MCHC RBC AUTO-ENTMCNC: 33.3 G/DL (ref 31.5–35.7)
MCV RBC AUTO: 86.2 FL (ref 79–97)
MONOCYTES # BLD AUTO: 0.88 10*3/MM3 (ref 0.1–0.9)
MONOCYTES NFR BLD AUTO: 11.5 % (ref 5–12)
NEUTROPHILS # BLD AUTO: 4.2 10*3/MM3 (ref 1.7–7)
NEUTROPHILS NFR BLD AUTO: 54.9 % (ref 42.7–76)
NRBC BLD AUTO-RTO: 0 /100 WBC (ref 0–0.2)
PLATELET # BLD AUTO: 360 10*3/MM3 (ref 140–450)
POTASSIUM SERPL-SCNC: 4.5 MMOL/L (ref 3.5–5.2)
PROT SERPL-MCNC: 7 G/DL (ref 6–8.5)
RBC # BLD AUTO: 4.64 10*6/MM3 (ref 3.77–5.28)
RHEUMATOID FACT SERPL-ACNC: <10 IU/ML
SODIUM SERPL-SCNC: 139 MMOL/L (ref 136–145)
TSH SERPL DL<=0.005 MIU/L-ACNC: 4.31 UIU/ML (ref 0.27–4.2)
URATE SERPL-MCNC: 4.3 MG/DL (ref 2.4–5.7)
WBC # BLD AUTO: 7.65 10*3/MM3 (ref 3.4–10.8)

## 2023-08-23 ENCOUNTER — PATIENT ROUNDING (BHMG ONLY) (OUTPATIENT)
Dept: FAMILY MEDICINE CLINIC | Facility: CLINIC | Age: 33
End: 2023-08-23
Payer: MEDICAID

## 2023-11-30 ENCOUNTER — LAB (OUTPATIENT)
Dept: LAB | Facility: HOSPITAL | Age: 33
End: 2023-11-30
Payer: MEDICAID

## 2023-11-30 ENCOUNTER — OFFICE VISIT (OUTPATIENT)
Dept: FAMILY MEDICINE CLINIC | Facility: CLINIC | Age: 33
End: 2023-11-30
Payer: MEDICAID

## 2023-11-30 VITALS
HEART RATE: 68 BPM | OXYGEN SATURATION: 98 % | SYSTOLIC BLOOD PRESSURE: 122 MMHG | DIASTOLIC BLOOD PRESSURE: 88 MMHG | HEIGHT: 62 IN | BODY MASS INDEX: 48.95 KG/M2 | WEIGHT: 266 LBS | TEMPERATURE: 97.7 F

## 2023-11-30 DIAGNOSIS — G89.29 CHRONIC PAIN OF MULTIPLE JOINTS: ICD-10-CM

## 2023-11-30 DIAGNOSIS — E66.01 CLASS 3 SEVERE OBESITY DUE TO EXCESS CALORIES WITHOUT SERIOUS COMORBIDITY WITH BODY MASS INDEX (BMI) OF 45.0 TO 49.9 IN ADULT: ICD-10-CM

## 2023-11-30 DIAGNOSIS — R79.89 ELEVATED TSH: ICD-10-CM

## 2023-11-30 DIAGNOSIS — F33.1 MODERATE EPISODE OF RECURRENT MAJOR DEPRESSIVE DISORDER: Primary | ICD-10-CM

## 2023-11-30 DIAGNOSIS — M25.50 CHRONIC PAIN OF MULTIPLE JOINTS: ICD-10-CM

## 2023-11-30 DIAGNOSIS — G43.709 CHRONIC MIGRAINE WITHOUT AURA WITHOUT STATUS MIGRAINOSUS, NOT INTRACTABLE: ICD-10-CM

## 2023-11-30 DIAGNOSIS — Z79.1 NSAID LONG-TERM USE: ICD-10-CM

## 2023-11-30 NOTE — ASSESSMENT & PLAN NOTE
Chronic pain in multiple joints as well as muscle pain.  Blood work was unrevealing for rheumatologic cause of her pain.  She has had significant improvement using diclofenac twice daily and tizanidine every 8 hours as needed.  Patient will continue these for the time being.  She will also discuss switching from Lexapro to Cymbalta with her behavioral health specialist as a suspect that her symptoms are likely secondary to fibromyalgia.  Will check kidney function today as she has been taking NSAIDs for quite some time.  RTC/ED precautions given.

## 2023-11-30 NOTE — PATIENT INSTRUCTIONS
"BMI for Adults  What is BMI?  Body mass index (BMI) is a number that is calculated from a person's weight and height. BMI can help estimate how much of a person's weight is composed of fat. BMI does not measure body fat directly. Rather, it is an alternative to procedures that directly measure body fat, which can be difficult and expensive.  BMI can help identify people who may be at higher risk for certain medical problems.  What are BMI measurements used for?  BMI is used as a screening tool to identify possible weight problems. It helps determine whether a person is obese, overweight, a healthy weight, or underweight.  BMI is useful for:  Identifying a weight problem that may be related to a medical condition or may increase the risk for medical problems.  Promoting changes, such as changes in diet and exercise, to help reach a healthy weight. BMI screening can be repeated to see if these changes are working.  How is BMI calculated?  BMI involves measuring your weight in relation to your height. Both height and weight are measured, and the BMI is calculated from those numbers. This can be done either in English ([place] or metric measurements. Note that charts and online BMI calculators are available to help you find your BMI quickly and easily without having to do these calculations yourself.  To calculate your BMI in English ([place] measurements:    Measure your weight in pounds (lb).  Multiply the number of pounds by 703.  For example, for a person who weighs 180 lb, multiply that number by 703, which equals [phone].  Measure your height in inches. Then multiply that number by itself to get a measurement called \"inches squared.\"  For example, for a person who is 70 inches tall, the \"inches squared\" measurement is 70 inches x 70 inches, which equals 4,900 inches squared.  Divide the total from step 2 (number of lb x 703) by the total from step 3 (inches squared): 126,540 ÷ 4,900 = 25.8. This is your BMI.  To " "calculate your BMI in metric measurements:  Measure your weight in kilograms (kg).  Measure your height in meters (m). Then multiply that number by itself to get a measurement called \"meters squared.\"  For example, for a person who is 1.75 m tall, the \"meters squared\" measurement is 1.75 m x 1.75 m, which is equal to 3.1 meters squared.  Divide the number of kilograms (your weight) by the meters squared number. In this example: 70 ÷ 3.1 = 22.6. This is your BMI.  What do the results mean?  BMI charts are used to identify whether you are underweight, normal weight, overweight, or obese. The following guidelines will be used:  Underweight: BMI less than 18.5.  Normal weight: BMI between 18.5 and 24.9.  Overweight: BMI between 25 and 29.9.  Obese: BMI of 30 or above.  Keep these notes in mind:  Weight includes both fat and muscle, so someone with a muscular build, such as an athlete, may have a BMI that is higher than 24.9. In cases like these, BMI is not an accurate measure of body fat.  To determine if excess body fat is the cause of a BMI of 25 or higher, further assessments may need to be done by a health care provider.  BMI is usually interpreted in the same way for men and women.  Where to find more information  For more information about BMI, including tools to quickly calculate your BMI, go to these websites:  Centers for Disease Control and Prevention: www.cdc.gov  American Heart Association: www.heart.org  National Heart, Lung, and Blood [place]: www.nhlbi.nih.gov  Summary  Body mass index (BMI) is a number that is calculated from a person's weight and height.  BMI may help estimate how much of a person's weight is composed of fat. BMI can help identify those who may be at higher risk for certain medical problems.  BMI can be measured using English measurements or metric measurements.  BMI charts are used to identify whether you are underweight, normal weight, overweight, or obese.  This information is not " intended to replace advice given to you by your health care provider. Make sure you discuss any questions you have with your health care provider.  Document Revised: 05/31/2023 Document Reviewed: 07/17/2020  Elsevier Patient Education © 2023 [employer]  Migraine Headache  A migraine headache is an intense, throbbing pain on one side or both sides of the head. Migraine headaches may also cause other symptoms, such as nausea, vomiting, and sensitivity to light and noise. A migraine headache can last from 4 hours to 3 days. Talk with your doctor about what things may bring on (trigger) your migraine headaches.  What are the causes?  The exact cause of this condition is not known. However, a migraine may be caused when nerves in the brain become irritated and release chemicals that cause inflammation of blood vessels. This inflammation causes pain. This condition may be triggered or caused by:  Drinking alcohol.  Smoking.  Taking medicines, such as:  Medicine used to treat chest pain (nitroglycerin).  Birth control pills.  Estrogen.  Certain blood pressure medicines.  Eating or drinking products that contain nitrates, glutamate, aspartame, or tyramine. Aged cheeses, chocolate, or caffeine may also be triggers.  Doing physical activity.  Other things that may trigger a migraine headache include:  Menstruation.  Pregnancy.  Hunger.  Stress.  Lack of sleep or too much sleep.  Weather changes.  Fatigue.  What increases the risk?  The following factors may make you more likely to experience migraine headaches:  Being a certain age. This condition is more common in people who are 25-55 years old.  Being female.  Having a family history of migraine headaches.  Being .  Having a mental health condition, such as depression or anxiety.  Being obese.  What are the signs or symptoms?  The main symptom of this condition is pulsating or throbbing pain. This pain may:  Happen in any area of the head, such as on one side or  both sides.  Interfere with daily activities.  Get worse with physical activity.  Get worse with exposure to bright lights or loud noises.  Other symptoms may include:  Nausea.  Vomiting.  Dizziness.  General sensitivity to bright lights, loud noises, or smells.  Before you get a migraine headache, you may get warning signs (an aura). An aura may include:  Seeing flashing lights or having blind spots.  Seeing bright spots, halos, or zigzag lines.  Having tunnel vision or blurred vision.  Having numbness or a tingling feeling.  Having trouble talking.  Having muscle weakness.  Some people have symptoms after a migraine headache (postdromal phase), such as:  Feeling tired.  Difficulty concentrating.  How is this diagnosed?  A migraine headache can be diagnosed based on:  Your symptoms.  A physical exam.  Tests, such as:  CT scan or an MRI of the head. These imaging tests can help rule out other causes of headaches.  Taking fluid from the spine (lumbar puncture) and analyzing it (cerebrospinal fluid analysis, or CSF analysis).  How is this treated?  This condition may be treated with medicines that:  Relieve pain.  Relieve nausea.  Prevent migraine headaches.  Treatment for this condition may also include:  Acupuncture.  Lifestyle changes like avoiding foods that trigger migraine headaches.  Biofeedback.  Cognitive behavioral therapy.  Follow these instructions at home:  Medicines  Take over-the-counter and prescription medicines only as told by your health care provider.  Ask your health care provider if the medicine prescribed to you:  Requires you to avoid driving or using heavy machinery.  Can cause constipation. You may need to take these actions to prevent or treat constipation:  Drink enough fluid to keep your urine pale yellow.  Take over-the-counter or prescription medicines.  Eat foods that are high in fiber, such as beans, whole grains, and fresh fruits and vegetables.  Limit foods that are high in fat and  processed sugars, such as fried or sweet foods.  Lifestyle  Do not drink alcohol.  Do not use any products that contain nicotine or tobacco, such as cigarettes, e-cigarettes, and chewing tobacco. If you need help quitting, ask your health care provider.  Get at least 8 hours of sleep every night.  Find ways to manage stress, such as meditation, deep breathing, or yoga.  General instructions  Keep a journal to find out what may trigger your migraine headaches. For example, write down:  What you eat and drink.  How much sleep you get.  Any change to your diet or medicines.  If you have a migraine headache:  Avoid things that make your symptoms worse, such as bright lights.  It may help to lie down in a dark, quiet room.  Do not drive or use heavy machinery.  Ask your health care provider what activities are safe for you while you are experiencing symptoms.  Keep all follow-up visits as told by your health care provider. This is important.  Contact a health care provider if:  You develop symptoms that are different or more severe than your usual migraine headache symptoms.  You have more than 15 headache days in one month.  Get help right away if:  Your migraine headache becomes severe.  Your migraine headache lasts longer than 72 hours.  You have a fever.  You have a stiff neck.  You have vision loss.  Your muscles feel weak or like you cannot control them.  You start to lose your balance often.  You have trouble walking.  You faint.  You have a seizure.  Summary  A migraine headache is an intense, throbbing pain on one side or both sides of the head. Migraines may also cause other symptoms, such as nausea, vomiting, and sensitivity to light and noise.  This condition may be treated with medicines and lifestyle changes. You may also need to avoid certain things that trigger a migraine headache.  Keep a journal to find out what may trigger your migraine headaches.  Contact your health care provider if you have more  than 15 headache days in a month or you develop symptoms that are different or more severe than your usual migraine headache symptoms.  This information is not intended to replace advice given to you by your health care provider. Make sure you discuss any questions you have with your health care provider.  Document Revised: 06/01/2023 Document Reviewed: 01/30/2020  Elsevier Patient Education © 2023 [employer]

## 2023-11-30 NOTE — PROGRESS NOTES
Nicole Jo is a 33 y.o. female who presents today for Depression, Pain, and Headache      Patient is here to follow-up on migraines, chronic pain, and depression. She does get migraines still and more frequently around her periods but they have not been worsening. She states migraines are relieved with maxalt. She has been taking diclofenac and tizanidine as needed. She feels like the diclofenac helps more than the tizanidine but the tizanidine does help with the muscle pains. She feels like her depression symptoms are well controlled at this time. She often has worsening of depression symptoms in the winter.  Patient has no acute complaints today.             8/21/2023     1:58 PM   PHQ-2/PHQ-9 Depression Screening   Little Interest or Pleasure in Doing Things 0-->not at all   Feeling Down, Depressed or Hopeless 0-->not at all   PHQ-9: Brief Depression Severity Measure Score 0       Review of Systems   Constitutional:  Negative for fever and unexpected weight loss.   HENT:  Negative for congestion, ear pain and sore throat.    Eyes:  Negative for visual disturbance.   Respiratory:  Negative for cough, shortness of breath and wheezing.    Cardiovascular:  Negative for chest pain and palpitations.   Gastrointestinal:  Negative for abdominal pain, blood in stool, constipation, diarrhea, nausea, vomiting and GERD.   Endocrine: Negative for polydipsia and polyuria.   Genitourinary:  Negative for difficulty urinating.   Musculoskeletal:  Positive for arthralgias and myalgias. Negative for joint swelling.   Skin:  Negative for rash and skin lesions.   Allergic/Immunologic: Negative for environmental allergies.   Neurological:  Positive for headache. Negative for seizures and syncope.   Hematological:  Does not bruise/bleed easily.   Psychiatric/Behavioral:  Negative for suicidal ideas.         The following portions of the patient's history were reviewed and updated as appropriate: allergies, current medications, past  "family history, past medical history, past social history, past surgical history and problem list.    Current Outpatient Medications on File Prior to Visit   Medication Sig Dispense Refill    diclofenac (VOLTAREN) 75 MG EC tablet Take 1 tablet by mouth 2 (Two) Times a Day. 180 tablet 1    escitalopram (LEXAPRO) 10 MG tablet Take 1 tablet by mouth Daily.      Loratadine 10 MG capsule Take  by mouth.      melatonin 5 MG tablet tablet Take 1 tablet by mouth.      propranolol (INDERAL) 10 MG tablet Take 1 tablet by mouth 2 (Two) Times a Day.      rizatriptan (Maxalt) 10 MG tablet Take 1 tablet by mouth 1 (One) Time As Needed for Migraine for up to 1 dose. May repeat in 2 hours if needed 9 tablet 5    tiZANidine (ZANAFLEX) 2 MG tablet Take 1 tablet by mouth Every 8 (Eight) Hours As Needed for Muscle Spasms. 90 tablet 3     No current facility-administered medications on file prior to visit.       Allergies   Allergen Reactions    Cephalexin         Visit Vitals  /88   Pulse 68   Temp 97.7 °F (36.5 °C)   Ht 157.5 cm (62\")   Wt 121 kg (266 lb)   SpO2 98%   BMI 48.65 kg/m²        Physical Exam  Constitutional:       General: She is not in acute distress.     Appearance: She is well-developed. She is not diaphoretic.   HENT:      Head: Atraumatic.   Cardiovascular:      Rate and Rhythm: Normal rate and regular rhythm.      Heart sounds: Normal heart sounds. No murmur heard.     No friction rub. No gallop.   Pulmonary:      Effort: Pulmonary effort is normal. No respiratory distress.      Breath sounds: Normal breath sounds. No stridor. No wheezing, rhonchi or rales.   Musculoskeletal:      Cervical back: Normal range of motion and neck supple.   Skin:     General: Skin is warm and dry.   Neurological:      Mental Status: She is alert and oriented to person, place, and time.   Psychiatric:         Behavior: Behavior normal.          Results for orders placed or performed in visit on 08/21/23   Hemoglobin A1c    " Specimen: Blood    Blood  Release to vandana   Result Value Ref Range    Hemoglobin A1C 5.60 4.80 - 5.60 %   TSH Rfx On Abnormal To Free T4    Specimen: Blood    Blood  Release to vandana   Result Value Ref Range    TSH 4.310 (H) 0.270 - 4.200 uIU/mL   Comprehensive Metabolic Panel    Specimen: Blood    Blood  Release to vandana   Result Value Ref Range    Glucose 98 65 - 99 mg/dL    BUN 10 6 - 20 mg/dL    Creatinine 0.79 0.57 - 1.00 mg/dL    EGFR Result 101.4 >60.0 mL/min/1.73    BUN/Creatinine Ratio 12.7 7.0 - 25.0    Sodium 139 136 - 145 mmol/L    Potassium 4.5 3.5 - 5.2 mmol/L    Chloride 103 98 - 107 mmol/L    Total CO2 24.9 22.0 - 29.0 mmol/L    Calcium 9.5 8.6 - 10.5 mg/dL    Total Protein 7.0 6.0 - 8.5 g/dL    Albumin 4.5 3.5 - 5.2 g/dL    Globulin 2.5 gm/dL    A/G Ratio 1.8 g/dL    Total Bilirubin 0.2 0.0 - 1.2 mg/dL    Alkaline Phosphatase 72 39 - 117 U/L    AST (SGOT) 25 1 - 32 U/L    ALT (SGPT) 32 1 - 33 U/L   Rheumatoid Factor    Specimen: Blood    Blood  Release to vandana   Result Value Ref Range    RA Latex Turbid <10.0 <14.0 IU/mL   Uric acid    Specimen: Blood    Blood  Release to vandana   Result Value Ref Range    Uric Acid 4.3 2.4 - 5.7 mg/dL   CK    Specimen: Blood    Blood  Release to vandana   Result Value Ref Range    Creatine Kinase 82 20 - 180 U/L   C-reactive protein    Specimen: Blood    Blood  Release to vandana   Result Value Ref Range    C-Reactive Protein <0.30 0.00 - 0.50 mg/dL   JEFF Direct Reflex to 11 Biomarker    Specimen: Blood    Blood  Release to vandana   Result Value Ref Range    JEFF Direct Negative Negative   CBC & Differential    Specimen: Blood    Blood  Release to vandana   Result Value Ref Range    WBC 7.65 3.40 - 10.80 10*3/mm3    RBC 4.64 3.77 - 5.28 10*6/mm3    Hemoglobin 13.3 12.0 - 15.9 g/dL    Hematocrit 40.0 34.0 - 46.6 %    MCV 86.2 79.0 - 97.0 fL    MCH 28.7 26.6 - 33.0 pg    MCHC 33.3 31.5 - 35.7 g/dL    RDW 13.9 12.3 - 15.4 %    Platelets 360 140 - 450 10*3/mm3    Neutrophil Rel % 54.9  42.7 - 76.0 %    Lymphocyte Rel % 28.1 19.6 - 45.3 %    Monocyte Rel % 11.5 5.0 - 12.0 %    Eosinophil Rel % 3.9 0.3 - 6.2 %    Basophil Rel % 0.7 0.0 - 1.5 %    Neutrophils Absolute 4.20 1.70 - 7.00 10*3/mm3    Lymphocytes Absolute 2.15 0.70 - 3.10 10*3/mm3    Monocytes Absolute 0.88 0.10 - 0.90 10*3/mm3    Eosinophils Absolute 0.30 0.00 - 0.40 10*3/mm3    Basophils Absolute 0.05 0.00 - 0.20 10*3/mm3    Immature Granulocyte Rel % 0.9 (H) 0.0 - 0.5 %    Immature Grans Absolute 0.07 (H) 0.00 - 0.05 10*3/mm3    nRBC 0.0 0.0 - 0.2 /100 WBC        Problems Addressed this Visit          Endocrine and Metabolic    Class 3 severe obesity due to excess calories without serious comorbidity with body mass index (BMI) of 45.0 to 49.9 in adult     Patient's (Body mass index is 48.65 kg/m².) indicates that they are morbidly/severely obese (BMI > 40 or > 35 with obesity - related health condition) with health conditions that include none . Weight is worsening. BMI  is above average; BMI management plan is completed. We discussed low calorie, low carb based diet program, portion control, and increasing exercise.             Health Encounters    NSAID long-term use    Relevant Orders    Comprehensive Metabolic Panel       Mental Health    Depression - Primary     Depression is chronic and stable.  She will continue Lexapro daily.  Patient is working to schedule with her behavioral health specialist.            Musculoskeletal and Injuries    Chronic pain of multiple joints     Chronic pain in multiple joints as well as muscle pain.  Blood work was unrevealing for rheumatologic cause of her pain.  She has had significant improvement using diclofenac twice daily and tizanidine every 8 hours as needed.  Patient will continue these for the time being.  She will also discuss switching from Lexapro to Cymbalta with her behavioral health specialist as a suspect that her symptoms are likely secondary to fibromyalgia.  Will check kidney  function today as she has been taking NSAIDs for quite some time.  RTC/ED precautions given.            Neuro    Migraine     Headaches are unchanged.  Continue current treatment regimen.                Symptoms and Signs    Elevated TSH     TSH was mildly elevated her previous visit.  Will recheck TSH today and discuss starting Synthroid if it remains elevated.         Relevant Orders    TSH Rfx On Abnormal To Free T4     Diagnoses         Codes Comments    Moderate episode of recurrent major depressive disorder    -  Primary ICD-10-CM: F33.1  ICD-9-CM: 296.32     Chronic pain of multiple joints     ICD-10-CM: M25.50, G89.29  ICD-9-CM: 719.49, 338.29     Chronic migraine without aura without status migrainosus, not intractable     ICD-10-CM: G43.709  ICD-9-CM: 346.70     Class 3 severe obesity due to excess calories without serious comorbidity with body mass index (BMI) of 45.0 to 49.9 in adult     ICD-10-CM: E66.01, Z68.42  ICD-9-CM: 278.01, V85.42     NSAID long-term use     ICD-10-CM: Z79.1  ICD-9-CM: V58.64     Elevated TSH     ICD-10-CM: R79.89  ICD-9-CM: 794.5             Return in about 3 months (around 2/29/2024) for Annual.    31 minutes was spent on this patient encounter which included history taking, physical exam, answering patient questions, counseling, discussing treatment plan, placing orders, and documentation.    Filipe Hernandez MD   11/30/2023

## 2023-11-30 NOTE — ASSESSMENT & PLAN NOTE
Depression is chronic and stable.  She will continue Lexapro daily.  Patient is working to schedule with her behavioral health specialist.

## 2023-11-30 NOTE — ASSESSMENT & PLAN NOTE
Patient's (Body mass index is 48.65 kg/m².) indicates that they are morbidly/severely obese (BMI > 40 or > 35 with obesity - related health condition) with health conditions that include none . Weight is worsening. BMI  is above average; BMI management plan is completed. We discussed low calorie, low carb based diet program, portion control, and increasing exercise.

## 2023-11-30 NOTE — ASSESSMENT & PLAN NOTE
TSH was mildly elevated her previous visit.  Will recheck TSH today and discuss starting Synthroid if it remains elevated.

## 2023-12-01 LAB
ALBUMIN SERPL-MCNC: 4.2 G/DL (ref 3.5–5.2)
ALBUMIN/GLOB SERPL: 1.8 G/DL
ALP SERPL-CCNC: 70 U/L (ref 39–117)
ALT SERPL-CCNC: 39 U/L (ref 1–33)
AST SERPL-CCNC: 25 U/L (ref 1–32)
BILIRUB SERPL-MCNC: 0.3 MG/DL (ref 0–1.2)
BUN SERPL-MCNC: 8 MG/DL (ref 6–20)
BUN/CREAT SERPL: 10.7 (ref 7–25)
CALCIUM SERPL-MCNC: 8.7 MG/DL (ref 8.6–10.5)
CHLORIDE SERPL-SCNC: 106 MMOL/L (ref 98–107)
CO2 SERPL-SCNC: 24.8 MMOL/L (ref 22–29)
CREAT SERPL-MCNC: 0.75 MG/DL (ref 0.57–1)
EGFRCR SERPLBLD CKD-EPI 2021: 107.3 ML/MIN/1.73
GLOBULIN SER CALC-MCNC: 2.4 GM/DL
GLUCOSE SERPL-MCNC: 96 MG/DL (ref 65–99)
POTASSIUM SERPL-SCNC: 5.8 MMOL/L (ref 3.5–5.2)
PROT SERPL-MCNC: 6.6 G/DL (ref 6–8.5)
SODIUM SERPL-SCNC: 139 MMOL/L (ref 136–145)
TSH SERPL DL<=0.005 MIU/L-ACNC: 1.6 UIU/ML (ref 0.27–4.2)

## 2023-12-09 DIAGNOSIS — E87.5 HYPERKALEMIA: Primary | ICD-10-CM

## 2023-12-09 DIAGNOSIS — R79.89 ELEVATED LFTS: ICD-10-CM

## 2024-01-26 DIAGNOSIS — M79.10 MYALGIA: ICD-10-CM

## 2024-01-26 RX ORDER — TIZANIDINE 2 MG/1
2 TABLET ORAL EVERY 8 HOURS PRN
Qty: 90 TABLET | Refills: 0 | Status: SHIPPED | OUTPATIENT
Start: 2024-01-26

## 2024-02-29 ENCOUNTER — LAB (OUTPATIENT)
Dept: LAB | Facility: HOSPITAL | Age: 34
End: 2024-02-29
Payer: MEDICAID

## 2024-02-29 ENCOUNTER — OFFICE VISIT (OUTPATIENT)
Dept: FAMILY MEDICINE CLINIC | Facility: CLINIC | Age: 34
End: 2024-02-29
Payer: MEDICAID

## 2024-02-29 VITALS
WEIGHT: 265.4 LBS | BODY MASS INDEX: 48.84 KG/M2 | TEMPERATURE: 98.4 F | OXYGEN SATURATION: 98 % | DIASTOLIC BLOOD PRESSURE: 84 MMHG | SYSTOLIC BLOOD PRESSURE: 136 MMHG | HEART RATE: 76 BPM | HEIGHT: 62 IN

## 2024-02-29 DIAGNOSIS — E66.01 CLASS 3 SEVERE OBESITY DUE TO EXCESS CALORIES WITHOUT SERIOUS COMORBIDITY WITH BODY MASS INDEX (BMI) OF 45.0 TO 49.9 IN ADULT: ICD-10-CM

## 2024-02-29 DIAGNOSIS — M25.50 CHRONIC PAIN OF MULTIPLE JOINTS: ICD-10-CM

## 2024-02-29 DIAGNOSIS — Z00.00 WELL ADULT EXAM: Primary | ICD-10-CM

## 2024-02-29 DIAGNOSIS — G89.29 CHRONIC PAIN OF MULTIPLE JOINTS: ICD-10-CM

## 2024-02-29 DIAGNOSIS — Z00.00 WELL ADULT EXAM: ICD-10-CM

## 2024-02-29 DIAGNOSIS — M79.10 MYALGIA: ICD-10-CM

## 2024-02-29 RX ORDER — TIZANIDINE 2 MG/1
2 TABLET ORAL EVERY 8 HOURS PRN
Qty: 90 TABLET | Refills: 3 | Status: SHIPPED | OUTPATIENT
Start: 2024-02-29

## 2024-02-29 RX ORDER — DICLOFENAC SODIUM 75 MG/1
75 TABLET, DELAYED RELEASE ORAL 2 TIMES DAILY
Qty: 180 TABLET | Refills: 3 | Status: SHIPPED | OUTPATIENT
Start: 2024-02-29

## 2024-02-29 NOTE — LETTER
[facility]  Vaccine Consent Form    Patient Name:  Nicole Jo  Patient :  1990     Vaccine(s) Ordered    Tdap Vaccine Greater Than or Equal To 6yo IM        Screening Checklist  The following questions should be completed prior to vaccination. If you answer “yes” to any question, it does not necessarily mean you should not be vaccinated. It just means we may need to clarify or ask more questions. If a question is unclear, please ask your healthcare provider to explain it.    Yes No   Any fever or moderate to severe illness today (mild illness and/or antibiotic treatment are not contraindications)?     Do you have a history of a serious reaction to any previous vaccinations, such as anaphylaxis, encephalopathy within 7 days, Guillain-[place] syndrome within 6 weeks, seizure?     Have you received any live vaccine(s) (e.g MMR, LIZETH) or any other vaccines in the last month (to ensure duplicate doses aren't given)?     Do you have an anaphylactic allergy to latex (DTaP, DTaP-IPV, Hep A, Hep B, MenB, RV, Td, Tdap), baker’s yeast (Hep B, HPV), polysorbates (RSV, nirsevimab, PCV 20, Rotavirrus, Tdap, Shingrix), or gelatin (LIZETH, MMR)?     Do you have an anaphylactic allergy to neomycin (Rabies, LIZETH, MMR, IPV, Hep A), polymyxin B (IPV), or streptomycin (IPV)?      Any cancer, leukemia, AIDS, or other immune system disorder? (LIZETH, MMR, RV)     Do you have a parent, brother, or sister with an immune system problem (if immune competence of vaccine recipient clinically verified, can proceed)? (MMR, LIZETH)     Any recent steroid treatments for >2 weeks, chemotherapy, or radiation treatment? (LIZETH, MMR)     Have you received antibody-containing blood transfusions or IVIG in the past 11 months (recommended interval is dependent on product)? (MMR, LIZETH)     Have you taken antiviral drugs (acyclovir, famciclovir, valacyclovir for LIZETH) in the last 24 or 48 hours, respectively?      Are you pregnant or planning to become  "pregnant within 1 month? (LIZETH, MMR, HPV, IPV, MenB, Abrexvy; For Hep B- refer to Engerix-B; For RSV - Abrysvo is indicated for 32-36 weeks of pregnancy from September to January)     For infants, have you ever been told your child has had intussusception or a medical emergency involving obstruction of the intestine (Rotavirus)? If not for an infant, can skip this question.         *Ordering Physicians/APC should be consulted if \"yes\" is checked by the patient or guardian above.  I have received, read, and understand the Vaccine Information Statement (VIS) for each vaccine ordered.  I have considered my or my child's health status as well as the health status of my close contacts.  I have taken the opportunity to discuss my vaccine questions with my or my child's health care provider.   I have requested that the ordered vaccine(s) be given to me or my child.  I understand the benefits and risks of the vaccines.  I understand that I should remain in the clinic for 15 minutes after receiving the vaccine(s).  _________________________________________________________  Signature of Patient or Parent/Legal Guardian ____________________  Date   "

## 2024-02-29 NOTE — PATIENT INSTRUCTIONS
"BMI for Adults  Body mass index (BMI) is a number found using a person's weight and height. BMI can help tell how much of a person's weight is made up of fat. BMI does not measure body fat directly. It is used instead of tests that directly measure body fat, which can be difficult and expensive.  What are BMI measurements used for?  BMI is useful to:  Find out if your weight puts you at higher risk for medical problems.  Help recommend changes, such as in diet and exercise. This can help you reach a healthy weight. BMI screening can be done again to see if these changes are working.  How is BMI calculated?  Your height and weight are measured. The BMI is found from those numbers. This can be done with [place] or metric measurements. Note that charts and online BMI calculators are available to help you find your BMI quickly and easily without doing these calculations.  To calculate your BMI in [place] measurements:  Measure your weight in pounds (lb).  Multiply the number of pounds by 703.  So, for an adult who weighs 150 lb, multiply that number by 703: 150 x 703, which equals 105,450.  Measure your height in inches. Then multiply that number by itself to get a measurement called \"inches squared.\"  So, for an adult who is 70 inches tall, the \"inches squared\" measurement is 70 inches x 70 inches, which equals 4,900 inches squared.  Divide the total from step 2 (number of lb x 703) by the total from step 3 (inches squared): 105,450 ÷ 4,900 = 21.5. This is your BMI.  To calculate your BMI in metric measurements:    Measure your weight in kilograms (kg).  For this example, the weight is 70 kg.  Measure your height in meters (m). Then multiply that number by itself to get a measurement called \"meters squared.\"  So, for an adult who is 1.75 m tall, the \"meters squared\" measurement is 1.75 m x 1.75 m, which equals 3.1 meters squared.  Divide the number of kilograms (your weight) by the meters squared number. In this example: 70 " ÷ 3.1 = 22.6. This is your BMI.  What do the results mean?  BMI charts are used to see if you are underweight, normal weight, overweight, or obese. The following guidelines will be used:  Underweight: BMI less than 18.5.  Normal weight: BMI between 18.5 and 24.9.  Overweight: BMI between 25 and 29.9.  Obese: BMI of 30 or above.  BMI is a tool and cannot diagnose a condition. Talk with your health care provider about what your BMI means for you. Keep these notes in mind:  Weight includes fat and muscle. Someone with a muscular build, such as an athlete, may have a BMI that is higher than 24.9. In cases like these, BMI is not a correct measure of body fat.  If you have a BMI of 25 or higher, your provider may need to do more testing to find out if excess body fat is the cause.  BMI is measured the same way for males and females. Females usually have more body fat than males of the same height and weight.  Where to find more information  For more information about BMI, including tools to quickly find your BMI, go to:  Centers for Disease Control and Prevention: cdc.gov  American Heart Association: heart.org  National Heart, Lung, and Blood [place]: nhlbi.nih.gov  This information is not intended to replace advice given to you by your health care provider. Make sure you discuss any questions you have with your health care provider.  Document Revised: 09/07/2023 Document Reviewed: 08/31/2023  [employer] Patient Education © 2023 [employer]  Hypertension, Adult  High blood pressure (hypertension) is when the force of blood pumping through the arteries is too strong. The arteries are the blood vessels that carry blood from the heart throughout the body. Hypertension forces the heart to work harder to pump blood and may cause arteries to become narrow or stiff. Untreated or uncontrolled hypertension can lead to a heart attack, heart failure, a stroke, kidney disease, and other problems.  A blood pressure reading consists  "of a higher number over a lower number. Ideally, your blood pressure should be below 120/80. The first (\"top\") number is called the systolic pressure. It is a measure of the pressure in your arteries as your heart beats. The second (\"bottom\") number is called the diastolic pressure. It is a measure of the pressure in your arteries as the heart relaxes.  What are the causes?  The exact cause of this condition is not known. There are some conditions that result in high blood pressure.  What increases the risk?  Certain factors may make you more likely to develop high blood pressure. Some of these risk factors are under your control, including:  Smoking.  Not getting enough exercise or physical activity.  Being overweight.  Having too much fat, sugar, calories, or salt (sodium) in your diet.  Drinking too much alcohol.  Other risk factors include:  Having a personal history of heart disease, diabetes, high cholesterol, or kidney disease.  Stress.  Having a family history of high blood pressure and high cholesterol.  Having obstructive sleep apnea.  Age. The risk increases with age.  What are the signs or symptoms?  High blood pressure may not cause symptoms. Very high blood pressure (hypertensive crisis) may cause:  Headache.  Fast or irregular heartbeats (palpitations).  Shortness of breath.  Nosebleed.  Nausea and vomiting.  Vision changes.  Severe chest pain, dizziness, and seizures.  How is this diagnosed?  This condition is diagnosed by measuring your blood pressure while you are seated, with your arm resting on a flat surface, your legs uncrossed, and your feet flat on the floor. The cuff of the blood pressure monitor will be placed directly against the skin of your upper arm at the level of your heart. Blood pressure should be measured at least twice using the same arm. Certain conditions can cause a difference in blood pressure between your right and left arms.  If you have a high blood pressure reading during " one visit or you have normal blood pressure with other risk factors, you may be asked to:  Return on a different day to have your blood pressure checked again.  Monitor your blood pressure at home for 1 week or longer.  If you are diagnosed with hypertension, you may have other blood or imaging tests to help your health care provider understand your overall risk for other conditions.  How is this treated?  This condition is treated by making healthy lifestyle changes, such as eating healthy foods, exercising more, and reducing your alcohol intake. You may be referred for counseling on a healthy diet and physical activity.  Your health care provider may prescribe medicine if lifestyle changes are not enough to get your blood pressure under control and if:  Your systolic blood pressure is above 130.  Your diastolic blood pressure is above 80.  Your personal target blood pressure may vary depending on your medical conditions, your age, and other factors.  Follow these instructions at home:  Eating and drinking    Eat a diet that is high in fiber and potassium, and low in sodium, added sugar, and fat. An example of this eating plan is called the DASH diet. DASH stands for Dietary Approaches to Stop Hypertension. To eat this way:  Eat plenty of fresh fruits and vegetables. Try to fill one half of your plate at each meal with fruits and vegetables.  Eat whole grains, such as whole-wheat pasta, brown rice, or whole-grain bread. Fill about one fourth of your plate with whole grains.  Eat or drink low-fat dairy products, such as skim milk or low-fat yogurt.  Avoid fatty cuts of meat, processed or cured meats, and poultry with skin. Fill about one fourth of your plate with lean proteins, such as fish, chicken without skin, beans, eggs, or tofu.  Avoid pre-made and processed foods. These tend to be higher in sodium, added sugar, and fat.  Reduce your daily sodium intake. Many people with hypertension should eat less than 1,500  mg of sodium a day.  Do not drink alcohol if:  Your health care provider tells you not to drink.  You are pregnant, may be pregnant, or are planning to become pregnant.  If you drink alcohol:  Limit how much you have to:  0-1 drink a day for women.  0-2 drinks a day for men.  Know how much alcohol is in your drink. In the [place] one drink equals one 12 oz bottle of beer (355 mL), one 5 oz glass of wine (148 mL), or one 1½ oz glass of hard liquor (44 mL).  Lifestyle    Work with your health care provider to maintain a healthy body weight or to lose weight. Ask what an ideal weight is for you.  Get at least 30 minutes of exercise that causes your heart to beat faster (aerobic exercise) most days of the week. Activities may include walking, swimming, or biking.  Include exercise to strengthen your muscles (resistance exercise), such as Pilates or lifting weights, as part of your weekly exercise routine. Try to do these types of exercises for 30 minutes at least 3 days a week.  Do not use any products that contain nicotine or tobacco. These products include cigarettes, chewing tobacco, and vaping devices, such as e-cigarettes. If you need help quitting, ask your health care provider.  Monitor your blood pressure at home as told by your health care provider.  Keep all follow-up visits. This is important.  Medicines  Take over-the-counter and prescription medicines only as told by your health care provider. Follow directions carefully. Blood pressure medicines must be taken as prescribed.  Do not skip doses of blood pressure medicine. Doing this puts you at risk for problems and can make the medicine less effective.  Ask your health care provider about side effects or reactions to medicines that you should watch for.  Contact a health care provider if you:  Think you are having a reaction to a medicine you are taking.  Have headaches that keep coming back (recurring).  Feel dizzy.  Have swelling in your ankles.  Have  trouble with your vision.  Get help right away if you:  Develop a severe headache or confusion.  Have unusual weakness or numbness.  Feel faint.  Have severe pain in your chest or abdomen.  Vomit repeatedly.  Have trouble breathing.  These symptoms may be an emergency. Get help right away. Call 911.  Do not wait to see if the symptoms will go away.  Do not drive yourself to the hospital.  Summary  Hypertension is when the force of blood pumping through your arteries is too strong. If this condition is not controlled, it may put you at risk for serious complications.  Your personal target blood pressure may vary depending on your medical conditions, your age, and other factors. For most people, a normal blood pressure is less than 120/80.  Hypertension is treated with lifestyle changes, medicines, or a combination of both. Lifestyle changes include losing weight, eating a healthy, low-sodium diet, exercising more, and limiting alcohol.  This information is not intended to replace advice given to you by your health care provider. Make sure you discuss any questions you have with your health care provider.  Document Revised: 10/25/2022 Document Reviewed: 10/25/2022  [employer] Patient Education © 2023 [employer]

## 2024-02-29 NOTE — ASSESSMENT & PLAN NOTE
Patient has chronic pain in multiple joints likely secondary to fibromyalgia.  Currently her low back, hips, and shoulders are bothering her the most.  She does get relief with tizanidine and diclofenac.  Patient is interested in trying OMT so she will schedule appointment with one of the DOs in the office for OMT in the future.

## 2024-02-29 NOTE — ASSESSMENT & PLAN NOTE
Patient's (Body mass index is 48.53 kg/m².) indicates that they are morbidly/severely obese (BMI > 40 or > 35 with obesity - related health condition) with health conditions that include hypertension . Weight is unchanged. BMI  is above average; BMI management plan is completed. We discussed low calorie, low carb based diet program, portion control, and increasing exercise.

## 2024-02-29 NOTE — PROGRESS NOTES
Nicole Jo is a 33 y.o. female who presents today for a well woman exam.    Chief Complaint   Patient presents with    Annual Exam        HPI     Last pap smear <1 years ago, results were normal. No history of abnormal pap smears. No family history of cervical, ovarian, breast, or uterine cancer.     No sexual partners in the last year.  No vaginal discharge, itching, dysuria, or pelvic pain. Not interested in screening for STIs.     Patient sees the dentist twice a year. She sees the optometrist periodically and is due for an appointment. She has not seen a dermatologist.     Diet is regular and generally healthy.     Physical activity includes nothing currently.     No sleep problems. Typically sleeps well but takes melatonin as needed. Average hours per night 4-7.     Mood problems controlled with lexapro. Follows with behavioral health.        2/29/2024     2:12 PM   PHQ-2/PHQ-9 Depression Screening   Little Interest or Pleasure in Doing Things 0-->not at all   Feeling Down, Depressed or Hopeless 0-->not at all   PHQ-9: Brief Depression Severity Measure Score 0       Review of Systems   Constitutional:  Negative for fever and unexpected weight loss.   HENT:  Negative for congestion, ear pain and sore throat.    Eyes:  Negative for visual disturbance.   Respiratory:  Negative for cough, shortness of breath and wheezing.    Cardiovascular:  Negative for chest pain and palpitations.   Gastrointestinal:  Negative for abdominal pain, blood in stool, constipation, diarrhea, nausea, vomiting and GERD.   Endocrine: Negative for polydipsia and polyuria.   Genitourinary:  Negative for difficulty urinating.   Musculoskeletal:  Positive for arthralgias (chronic and improving), back pain (chronic and stable) and myalgias (chronic and improving). Negative for joint swelling.   Skin:  Negative for rash and skin lesions.   Allergic/Immunologic: Negative for environmental allergies.   Neurological:  Positive for headache  (chronic and stable). Negative for seizures and syncope.   Hematological:  Does not bruise/bleed easily.   Psychiatric/Behavioral:  Negative for suicidal ideas.         Health Maintenance   Topic Date Due    ANNUAL PHYSICAL  02/28/2025    BMI FOLLOWUP  02/28/2025    PAP SMEAR  08/29/2026    TDAP/TD VACCINES (2 - Td or Tdap) 02/28/2034    HEPATITIS C SCREENING  Completed    COVID-19 Vaccine  Completed    INFLUENZA VACCINE  Completed    Pneumococcal Vaccine 0-64  Aged Out       Obstetric History:  OB History    No obstetric history on file.        Menstrual History:     No LMP recorded.         Past Medical History:   Diagnosis Date    Acute pharyngitis     ADHD (attention deficit hyperactivity disorder)     elementary school dx    Anxiety     Depression     Eustachian tube dysfunction     Fibromyalgia, primary     Hypertension     Insomnia     Joint pain, knee     Migraine     Premenstrual dysphoric disorder     Upper respiratory infection     Weight gain         Past Surgical History:   Procedure Laterality Date    SALPINGECTOMY Bilateral     TONSILLECTOMY      WISDOM TOOTH EXTRACTION          Family History   Problem Relation Age of Onset    Bipolar disorder Mother     Arthritis Mother     Anxiety disorder Mother     Depression Mother     Irritable bowel syndrome Mother     Endometriosis Mother     LOGAN disease Mother     Cancer Mother         pancreatic cancer    Hyperlipidemia Mother     Mental illness Mother         bipolar/manic depressive    Miscarriages / Stillbirths Mother     Other Mother         chronic pain, ibs, acid reflux, endometriosis    Depression Father     Hypertension Father     Hyperlipidemia Father     Arthritis Father     Asthma Father     Diabetes Father     Mental illness Father         depression    ADD / ADHD Brother     Bipolar disorder Brother     Hepatitis Brother         C    Drug abuse Brother     ADD / ADHD Brother     Drug abuse Brother     Transient ischemic attack Maternal  Grandmother     Stroke Maternal Grandmother     Hypertension Maternal Grandmother     Dementia Maternal Grandmother     Osteoporosis Maternal Grandmother     Other Maternal Grandmother         stroke, dementia, hpertension, osteoporosis    Lymphoma Maternal Grandfather     Meniere's disease Maternal Grandfather     Other Maternal Grandfather         non-hodgkins lymphoma    Arthritis Paternal Grandmother     Stroke Paternal Grandmother     Hypertension Paternal Grandmother     Other Paternal Grandmother         blood clot unknown site    Pancreatic cancer Paternal Grandfather     Hypertension Paternal Grandfather     Hyperlipidemia Paternal Grandfather     Diverticulosis Paternal Grandfather     Other Paternal Grandfather         cancer,  of heart attack        Social History     Socioeconomic History    Marital status:    Tobacco Use    Smoking status: Never     Passive exposure: Never    Smokeless tobacco: Never   Vaping Use    Vaping Use: Every day    Substances: THC (3 gram)    Devices: Disposable    Passive vaping exposure: Yes   Substance and Sexual Activity    Alcohol use: Not Currently     Comment: once or twice a month, if that    Drug use: Yes     Frequency: 7.0 times per week     Types: Marijuana     Comment: smoke everyday for pain    Sexual activity: Not Currently     Partners: Female, Male     Birth control/protection: Surgical, Implant     Comment: bilateral saplingectomy        Current Outpatient Medications on File Prior to Visit   Medication Sig Dispense Refill    escitalopram (LEXAPRO) 10 MG tablet Take 1 tablet by mouth Daily.      Loratadine 10 MG capsule Take  by mouth.      melatonin 5 MG tablet tablet Take 1 tablet by mouth.      propranolol (INDERAL) 10 MG tablet Take 1 tablet by mouth 2 (Two) Times a Day.      rizatriptan (Maxalt) 10 MG tablet Take 1 tablet by mouth 1 (One) Time As Needed for Migraine for up to 1 dose. May repeat in 2 hours if needed 9 tablet 5    [DISCONTINUED]  "diclofenac (VOLTAREN) 75 MG EC tablet Take 1 tablet by mouth 2 (Two) Times a Day. 180 tablet 1    [DISCONTINUED] tiZANidine (ZANAFLEX) 2 MG tablet TAKE 1 TABLET BY MOUTH EVERY 8 HOURS AS NEEDED FOR MUSCLE SPASMS 90 tablet 0    Etonogestrel (NEXPLANON SC) Inject  under the skin into the appropriate area as directed. Implanted August 30 of 2023       No current facility-administered medications on file prior to visit.       Allergies   Allergen Reactions    Cephalexin         Visit Vitals  /84   Pulse 76   Temp 98.4 °F (36.9 °C) (Temporal)   Ht 157.5 cm (62.01\")   Wt 120 kg (265 lb 6.4 oz)   SpO2 98%   BMI 48.53 kg/m²        Physical Exam  Constitutional:       General: She is not in acute distress.     Appearance: She is well-developed. She is not diaphoretic.   HENT:      Head: Atraumatic.   Cardiovascular:      Rate and Rhythm: Normal rate and regular rhythm.      Heart sounds: Normal heart sounds. No murmur heard.     No friction rub. No gallop.   Pulmonary:      Effort: Pulmonary effort is normal. No respiratory distress.      Breath sounds: Normal breath sounds. No stridor. No wheezing, rhonchi or rales.   Abdominal:      General: Bowel sounds are normal. There is no distension.      Palpations: Abdomen is soft. There is no mass.      Tenderness: There is no abdominal tenderness. There is no guarding or rebound.      Hernia: No hernia is present.   Musculoskeletal:      Cervical back: Normal range of motion and neck supple.   Skin:     General: Skin is warm and dry.   Neurological:      Mental Status: She is alert and oriented to person, place, and time.   Psychiatric:         Behavior: Behavior normal.          Results for orders placed or performed in visit on 11/30/23   TSH Rfx On Abnormal To Free T4    Specimen: Blood    Blood  Release to vandana   Result Value Ref Range    TSH 1.600 0.270 - 4.200 uIU/mL   Comprehensive Metabolic Panel    Specimen: Blood    Blood  Release to vandana   Result Value Ref Range "    Glucose 96 65 - 99 mg/dL    BUN 8 6 - 20 mg/dL    Creatinine 0.75 0.57 - 1.00 mg/dL    EGFR Result 107.3 >60.0 mL/min/1.73    BUN/Creatinine Ratio 10.7 7.0 - 25.0    Sodium 139 136 - 145 mmol/L    Potassium 5.8 (H) 3.5 - 5.2 mmol/L    Chloride 106 98 - 107 mmol/L    Total CO2 24.8 22.0 - 29.0 mmol/L    Calcium 8.7 8.6 - 10.5 mg/dL    Total Protein 6.6 6.0 - 8.5 g/dL    Albumin 4.2 3.5 - 5.2 g/dL    Globulin 2.4 gm/dL    A/G Ratio 1.8 g/dL    Total Bilirubin 0.3 0.0 - 1.2 mg/dL    Alkaline Phosphatase 70 39 - 117 U/L    AST (SGOT) 25 1 - 32 U/L    ALT (SGPT) 39 (H) 1 - 33 U/L        Immunization History   Administered Date(s) Administered    COVID-19 (THOMAS) 05/10/2021    COVID-19 (MODERNA) BIVALENT 12+YRS 12/15/2022    COVID-19 (PFIZER) Purple Cap Monovalent 01/28/2022    COVID-19 F23 (MODERNA) 12YRS+ (SPIKEVAX) 11/16/2023    Fluzone (or Fluarix & Flulaval for VFC) >6mos 10/04/2016, 12/15/2022    Influenza Injectable Mdck Pf Quad 01/28/2022, 11/16/2023    Tdap 02/29/2024    influenza Split 10/04/2016       Problems Addressed this Visit          Endocrine and Metabolic    Class 3 severe obesity due to excess calories without serious comorbidity with body mass index (BMI) of 45.0 to 49.9 in adult     Patient's (Body mass index is 48.53 kg/m².) indicates that they are morbidly/severely obese (BMI > 40 or > 35 with obesity - related health condition) with health conditions that include hypertension . Weight is unchanged. BMI  is above average; BMI management plan is completed. We discussed low calorie, low carb based diet program, portion control, and increasing exercise.          Relevant Orders    Comprehensive Metabolic Panel    TSH Rfx On Abnormal To Free T4    CBC & Differential    Lipid Panel    Hemoglobin A1c       Health Encounters    Well adult exam - Primary     The patient is here for health maintenance visit.  Currently, the patient consumes a healthy diet and has an inadequate exercise regimen.   Screening lab work is ordered.  Immunizations were reviewed today.  Advice and education was given regarding nutrition, aerobic exercise, routine dental evaluations, routine eye exams, reproductive health, cardiovascular risk reduction, sunscreen use, self skin examination (annual dermatology evaluations) and seatbelt use (general overall safety).  Further recommendations will be given if needed after lab evaluation.  Annual wellness evaluation is recommended.           Relevant Orders    Comprehensive Metabolic Panel    TSH Rfx On Abnormal To Free T4    CBC & Differential    Lipid Panel    Hemoglobin A1c    Tdap Vaccine Greater Than or Equal To 6yo IM (Completed)       Musculoskeletal and Injuries    Chronic pain of multiple joints     Patient has chronic pain in multiple joints likely secondary to fibromyalgia.  Currently her low back, hips, and shoulders are bothering her the most.  She does get relief with tizanidine and diclofenac.  Patient is interested in trying OMT so she will schedule appointment with one of the DOs in the office for OMT in the future.         Relevant Medications    diclofenac (VOLTAREN) 75 MG EC tablet    Myalgia    Relevant Medications    tiZANidine (ZANAFLEX) 2 MG tablet     Diagnoses         Codes Comments    Well adult exam    -  Primary ICD-10-CM: Z00.00  ICD-9-CM: V70.0     Class 3 severe obesity due to excess calories without serious comorbidity with body mass index (BMI) of 45.0 to 49.9 in adult     ICD-10-CM: E66.01, Z68.42  ICD-9-CM: 278.01, V85.42     Myalgia     ICD-10-CM: M79.10  ICD-9-CM: 729.1     Chronic pain of multiple joints     ICD-10-CM: M25.50, G89.29  ICD-9-CM: 719.49, 338.29                Return in about 3 months (around 5/29/2024) for Follow-up elevated blood pressure, migraines, fibromyalgia.    Filipe Hernandez MD  2/29/2024

## 2024-03-01 LAB
ALBUMIN SERPL-MCNC: 4.4 G/DL (ref 3.5–5.2)
ALBUMIN/GLOB SERPL: 1.6 G/DL
ALP SERPL-CCNC: 79 U/L (ref 39–117)
ALT SERPL-CCNC: 51 U/L (ref 1–33)
AST SERPL-CCNC: 26 U/L (ref 1–32)
BASOPHILS # BLD AUTO: 0.05 10*3/MM3 (ref 0–0.2)
BASOPHILS NFR BLD AUTO: 0.7 % (ref 0–1.5)
BILIRUB SERPL-MCNC: 0.4 MG/DL (ref 0–1.2)
BUN SERPL-MCNC: 8 MG/DL (ref 6–20)
BUN/CREAT SERPL: 8.7 (ref 7–25)
CALCIUM SERPL-MCNC: 9.1 MG/DL (ref 8.6–10.5)
CHLORIDE SERPL-SCNC: 102 MMOL/L (ref 98–107)
CHOLEST SERPL-MCNC: 207 MG/DL (ref 0–200)
CO2 SERPL-SCNC: 25.2 MMOL/L (ref 22–29)
CREAT SERPL-MCNC: 0.92 MG/DL (ref 0.57–1)
EGFRCR SERPLBLD CKD-EPI 2021: 84 ML/MIN/1.73
EOSINOPHIL # BLD AUTO: 0.31 10*3/MM3 (ref 0–0.4)
EOSINOPHIL NFR BLD AUTO: 4.3 % (ref 0.3–6.2)
ERYTHROCYTE [DISTWIDTH] IN BLOOD BY AUTOMATED COUNT: 13.5 % (ref 12.3–15.4)
GLOBULIN SER CALC-MCNC: 2.7 GM/DL
GLUCOSE SERPL-MCNC: 94 MG/DL (ref 65–99)
HBA1C MFR BLD: 5.7 % (ref 4.8–5.6)
HCT VFR BLD AUTO: 42 % (ref 34–46.6)
HDLC SERPL-MCNC: 35 MG/DL (ref 40–60)
HGB BLD-MCNC: 14 G/DL (ref 12–15.9)
IMM GRANULOCYTES # BLD AUTO: 0.01 10*3/MM3 (ref 0–0.05)
IMM GRANULOCYTES NFR BLD AUTO: 0.1 % (ref 0–0.5)
LDLC SERPL CALC-MCNC: 162 MG/DL (ref 0–100)
LYMPHOCYTES # BLD AUTO: 2.21 10*3/MM3 (ref 0.7–3.1)
LYMPHOCYTES NFR BLD AUTO: 30.8 % (ref 19.6–45.3)
MCH RBC QN AUTO: 28.8 PG (ref 26.6–33)
MCHC RBC AUTO-ENTMCNC: 33.3 G/DL (ref 31.5–35.7)
MCV RBC AUTO: 86.4 FL (ref 79–97)
MONOCYTES # BLD AUTO: 0.7 10*3/MM3 (ref 0.1–0.9)
MONOCYTES NFR BLD AUTO: 9.7 % (ref 5–12)
NEUTROPHILS # BLD AUTO: 3.9 10*3/MM3 (ref 1.7–7)
NEUTROPHILS NFR BLD AUTO: 54.4 % (ref 42.7–76)
NRBC BLD AUTO-RTO: 0 /100 WBC (ref 0–0.2)
PLATELET # BLD AUTO: 354 10*3/MM3 (ref 140–450)
POTASSIUM SERPL-SCNC: 4.5 MMOL/L (ref 3.5–5.2)
PROT SERPL-MCNC: 7.1 G/DL (ref 6–8.5)
RBC # BLD AUTO: 4.86 10*6/MM3 (ref 3.77–5.28)
SODIUM SERPL-SCNC: 139 MMOL/L (ref 136–145)
TRIGL SERPL-MCNC: 58 MG/DL (ref 0–150)
TSH SERPL DL<=0.005 MIU/L-ACNC: 1.87 UIU/ML (ref 0.27–4.2)
VLDLC SERPL CALC-MCNC: 10 MG/DL (ref 5–40)
WBC # BLD AUTO: 7.18 10*3/MM3 (ref 3.4–10.8)

## 2024-03-06 ENCOUNTER — TELEPHONE (OUTPATIENT)
Dept: FAMILY MEDICINE CLINIC | Facility: CLINIC | Age: 34
End: 2024-03-06
Payer: MEDICAID

## 2024-03-11 DIAGNOSIS — M79.10 MYALGIA: ICD-10-CM

## 2024-03-11 RX ORDER — TIZANIDINE 2 MG/1
2 TABLET ORAL EVERY 8 HOURS PRN
Qty: 90 TABLET | Refills: 0 | Status: SHIPPED | OUTPATIENT
Start: 2024-03-11

## 2024-04-16 DIAGNOSIS — M25.50 CHRONIC PAIN OF MULTIPLE JOINTS: ICD-10-CM

## 2024-04-16 DIAGNOSIS — G89.29 CHRONIC PAIN OF MULTIPLE JOINTS: ICD-10-CM

## 2024-04-16 DIAGNOSIS — M79.10 MYALGIA: ICD-10-CM

## 2024-04-16 RX ORDER — TIZANIDINE 2 MG/1
2 TABLET ORAL EVERY 8 HOURS PRN
Qty: 90 TABLET | Refills: 0 | Status: SHIPPED | OUTPATIENT
Start: 2024-04-16

## 2024-04-16 RX ORDER — DICLOFENAC SODIUM 75 MG/1
75 TABLET, DELAYED RELEASE ORAL 2 TIMES DAILY
Qty: 180 TABLET | Refills: 0 | Status: SHIPPED | OUTPATIENT
Start: 2024-04-16

## 2024-05-21 ENCOUNTER — OFFICE VISIT (OUTPATIENT)
Dept: FAMILY MEDICINE CLINIC | Facility: CLINIC | Age: 34
End: 2024-05-21
Payer: MEDICAID

## 2024-05-21 VITALS
HEIGHT: 62 IN | OXYGEN SATURATION: 98 % | SYSTOLIC BLOOD PRESSURE: 128 MMHG | BODY MASS INDEX: 48.95 KG/M2 | HEART RATE: 76 BPM | TEMPERATURE: 98 F | WEIGHT: 266 LBS | DIASTOLIC BLOOD PRESSURE: 86 MMHG

## 2024-05-21 DIAGNOSIS — F33.1 MODERATE EPISODE OF RECURRENT MAJOR DEPRESSIVE DISORDER: ICD-10-CM

## 2024-05-21 DIAGNOSIS — M79.10 MYALGIA: ICD-10-CM

## 2024-05-21 DIAGNOSIS — G43.709 CHRONIC MIGRAINE WITHOUT AURA WITHOUT STATUS MIGRAINOSUS, NOT INTRACTABLE: ICD-10-CM

## 2024-05-21 DIAGNOSIS — M79.7 FIBROMYALGIA: Primary | ICD-10-CM

## 2024-05-21 PROCEDURE — 1125F AMNT PAIN NOTED PAIN PRSNT: CPT | Performed by: FAMILY MEDICINE

## 2024-05-21 PROCEDURE — 1159F MED LIST DOCD IN RCRD: CPT | Performed by: FAMILY MEDICINE

## 2024-05-21 PROCEDURE — 99214 OFFICE O/P EST MOD 30 MIN: CPT | Performed by: FAMILY MEDICINE

## 2024-05-21 PROCEDURE — 1160F RVW MEDS BY RX/DR IN RCRD: CPT | Performed by: FAMILY MEDICINE

## 2024-05-21 RX ORDER — BACLOFEN 10 MG/1
10 TABLET ORAL 3 TIMES DAILY
Qty: 90 TABLET | Refills: 1 | Status: SHIPPED | OUTPATIENT
Start: 2024-05-21

## 2024-05-21 RX ORDER — SUMATRIPTAN 100 MG/1
TABLET, FILM COATED ORAL
Qty: 9 TABLET | Refills: 3 | Status: SHIPPED | OUTPATIENT
Start: 2024-05-21

## 2024-05-21 NOTE — ASSESSMENT & PLAN NOTE
Pain has shown some improvement but she does not feel that the medications were working as well as they were when she restarted them.  She will continue diclofenac 75 mg twice daily.  Will discontinue the Zanaflex and switch to baclofen.  Patient has appointment with Dr. Garrido for OMT on 6/4/2024.  She was encouraged to keep this appointment and we will schedule follow-up with myself or Dr. Garrido after that appointment.

## 2024-05-21 NOTE — PATIENT INSTRUCTIONS
Migraine Headache  A migraine headache is an intense pulsing or throbbing pain on one or both sides of the head. Migraine headaches may also cause other symptoms, such as nausea, vomiting, and sensitivity to light and noise. A migraine headache can last from 4 hours to 3 days. Talk with your health care provider about what things may bring on (trigger) your migraine headaches.  What are the causes?  The exact cause is not known. However, a migraine may be caused when nerves in the brain get irritated and release chemicals that cause blood vessels to become inflamed. This inflammation causes pain. Migraines may be triggered or caused by:  Smoking.  Medicines, such as:  Nitroglycerin, which is used to treat chest pain.  Birth control pills.  Estrogen.  Certain blood pressure medicines.  Foods or drinks that contain nitrates, glutamate, aspartame, MSG, or tyramine.  Certain foods or drinks, such as aged cheeses, chocolate, alcohol, or caffeine.  Doing physical activity that is very hard.  Other triggers may include:  Menstruation.  Pregnancy.  Hunger.  Stress.  Getting too much or too little sleep.  Weather changes.  Tiredness (fatigue).  What increases the risk?  The following factors may make you more likely to have migraine headaches:  Being between the ages of 25-55 years old.  Being female.  Having a family history of migraine headaches.  Being .  Having a mental health condition, such as depression or anxiety.  Being obese.  What are the signs or symptoms?  The main symptom of this condition is pulsing or throbbing pain. This pain may:  Happen in any area of the head, such as on one or both sides.  Make it hard to do daily activities.  Get worse with physical activity.  Get worse around bright lights, loud noises, or smells.  Other symptoms may include:  Nausea.  Vomiting.  Dizziness.  Before a migraine headache starts, you may get warning signs (an aura). An aura may include:  Seeing flashing lights or  having blind spots.  Seeing bright spots, halos, or zigzag lines.  Having tunnel vision or blurred vision.  Having numbness or a tingling feeling.  Having trouble talking.  Having muscle weakness.  After a migraine ends, you may have symptoms. These may include:  Feeling tired.  Trouble concentrating.  How is this diagnosed?  A migraine headache can be diagnosed based on:  Your symptoms.  A physical exam.  Tests, such as:  A CT scan or an MRI of the head. These tests can help rule out other causes of headaches.  Taking fluid from the spine (lumbar puncture) to examine it (cerebrospinal fluid analysis, or CSF analysis).  How is this treated?  This condition may be treated with medicines that:  Relieve pain and nausea.  Prevent migraines.  Treatment may also include:  Acupuncture.  Lifestyle changes like avoiding foods that trigger migraine headaches.  Learning ways to control your body (biofeedback).  Talk therapy to help you know and deal with negative thoughts (cognitive behavioral therapy).  Follow these instructions at home:  Medicines  Take over-the-counter and prescription medicines only as told by your provider.  Ask your provider if the medicine prescribed to you:  Requires you to avoid driving or using machinery.  Can cause constipation. You may need to take these actions to prevent or treat constipation:  Drink enough fluid to keep your pee (urine) pale yellow.  Take over-the-counter or prescription medicines.  Eat foods that are high in fiber, such as beans, whole grains, and fresh fruits and vegetables.  Limit foods that are high in fat and processed sugars, such as fried or sweet foods.  Lifestyle    Do not drink alcohol.  Do not use any products that contain nicotine or tobacco. These products include cigarettes, chewing tobacco, and vaping devices, such as e-cigarettes. If you need help quitting, ask your provider.  Get 7-9 hours of sleep each night, or the amount recommended by your provider.  Find  ways to manage stress, such as meditation, deep breathing, or yoga.  Try to exercise regularly. This can help lessen how bad and how often your migraines occur.  General instructions  Keep a journal to find out what triggers your migraines, so you can avoid those things. For example, write down:  What you eat and drink.  How much sleep you get.  Any change to your diet or medicines.  If you have a migraine headache:  Avoid things that make your symptoms worse, such as bright lights.  Lie down in a dark, quiet room.  Do not drive or use machinery.  Ask your provider what activities are safe for you while you have symptoms.  Keep all follow-up visits. Your provider will monitor your symptoms and recommend any further treatment.  Where to find more information  Coalition for Headache and Migraine Patients (CHAMP): headachemigraine.org  American Migraine Foundation: americanmigrainefoundation.org  National Headache Foundation: headaches.org  Contact a health care provider if:  You have symptoms that are different or worse than your usual migraine headache symptoms.  You have more than 15 days of headaches in one month.  Get help right away if:  Your migraine headache becomes severe or lasts more than 72 hours.  You have a fever or stiff neck.  You have vision loss.  Your muscles feel weak or like you cannot control them.  You lose your balance often or have trouble walking.  You faint.  You have a seizure.  This information is not intended to replace advice given to you by your health care provider. Make sure you discuss any questions you have with your health care provider.  Document Revised: 08/14/2023 Document Reviewed: 08/14/2023  Elsevier Patient Education © 2024 Else[employer] Inc.  Chronic Back Pain  Chronic back pain is back pain that lasts longer than 3 months. The cause of your back pain may not be known. Some common causes include:  Wear and tear (degenerative disease) of the bones, disks, or tissues that connect  bones to each other (ligaments) in your back.  Inflammation and stiffness in your back (arthritis).  If you have chronic back pain, you may have times when the pain is more intense (flare-ups). You can also learn to manage the pain with home care.  Follow these instructions at home:  Watch for any changes in your symptoms. Take these actions to help with your pain:  Managing pain and stiffness         If told, put ice on the painful area. You may be told to apply ice for the first 24-48 hours after a flare-up starts.  Put ice in a plastic bag.  Place a towel between your skin and the bag.  Leave the ice on for 20 minutes, 2-3 times per day.  If told, apply heat to the affected area as often as told by your health care provider. Use the heat source that your provider recommends, such as a moist heat pack or a heating pad.  Place a towel between your skin and the heat source.  Leave the heat on for 20-30 minutes.  If your skin turns bright red, remove the ice or heat right away to prevent skin damage. The risk of damage is higher if you cannot feel pain, heat, or cold.  Try soaking in a warm tub.  Activity              Avoid bending and other activities that make the pain worse.  Have good posture when you stand or sit.  When you stand, keep your upper back and neck straight, with your shoulders pulled back. Avoid slouching.  When you sit, keep your back straight. Relax your shoulders. Do not round your shoulders or pull them backward.  Do not sit or  one place for too long.  Take brief periods of rest during the day. This will reduce your pain. Resting in a lying or standing position is often better than sitting to rest.  When you rest for longer periods, mix in some mild activity or stretching between periods of rest. This will help to prevent stiffness and pain.  Get regular exercise. Ask your provider what activities are safe for you.  You may have to avoid lifting. Ask your provider how much you can  safely lift. If you do lift, always use the right technique. This means you should:  Bend your knees.  Keep the load close to your body.  Avoid twisting.  Medicines  Take over-the-counter and prescription medicines only as told by your provider.  You may need to take medicines for pain and inflammation. These may be taken by mouth or put on the skin. You may also be given muscle relaxants.  Ask your provider if the medicine prescribed to you:  Requires you to avoid driving or using machinery.  Can cause constipation. You may need to take these actions to prevent or treat constipation:  Drink enough fluid to keep your pee (urine) pale yellow.  Take over-the-counter or prescription medicines.  Eat foods that are high in fiber, such as beans, whole grains, and fresh fruits and vegetables.  Limit foods that are high in fat and processed sugars, such as fried or sweet foods.  General instructions    Sleep on a firm mattress in a comfortable position. Try lying on your side with your knees slightly bent. If you lie on your back, put a pillow under your knees.  Do not use any products that contain nicotine or tobacco. These products include cigarettes, chewing tobacco, and vaping devices, such as e-cigarettes. If you need help quitting, ask your provider.  Contact a health care provider if:  You have pain that does not get better with rest or medicine.  You have new pain.  You have a fever.  You lose weight quickly.  You have trouble doing your normal activities.  You feel weak or numb in one or both of your legs or feet.  Get help right away if:  You are not able to control when you pee or poop.  You have severe back pain and:  Nausea or vomiting.  Pain in your chest or abdomen.  Shortness of breath.  You faint.  These symptoms may be an emergency. Get help right away. Call 911.  Do not wait to see if the symptoms will go away.  Do not drive yourself to the hospital.  This information is not intended to replace advice  given to you by your health care provider. Make sure you discuss any questions you have with your health care provider.  Document Revised: 08/07/2023 Document Reviewed: 08/07/2023  Elsevier Patient Education © 2024 Elsevier Inc.

## 2024-05-21 NOTE — PROGRESS NOTES
Nicole Jo is a 33 y.o. female who presents today for Fibromyalgia (Pt states that she may need an increase in her medication), Hypertension, and Migraine      She has been taking propranolol twice a day for anxiety. She has had been taking lexapro as directed. She feels like her depression and anxiety are mostly well controlled but has more stress dealing with a roommate. This may be helping keep her blood pressure in range. She has been taking tizanidine 3mg TID and diclofenac twice a day. She does not feel they are controlling her chronic pain thought to be from fibromyalgia as well as it was in the past. Currently her low back pain is the worst. Pain does radiate into the hips and upper thighs. She has been doing stretching exercises but these have not seemed to provide much relief. She has migraines about once every 2-3 weeks. She uses maxalt as needed and will stop the migraine sometimes but she often has to take several doses to get the migraine to resolve.       Review of Systems   Constitutional:  Negative for fever and unexpected weight loss.   HENT:  Negative for congestion, ear pain and sore throat.    Eyes:  Negative for visual disturbance.   Respiratory:  Negative for cough, shortness of breath and wheezing.    Cardiovascular:  Negative for chest pain and palpitations.   Gastrointestinal:  Negative for abdominal pain, blood in stool, constipation, diarrhea, nausea, vomiting and GERD.   Endocrine: Negative for polydipsia and polyuria.   Genitourinary:  Negative for difficulty urinating.   Musculoskeletal:  Positive for arthralgias (chronic), back pain (chronic) and myalgias (chronic). Negative for joint swelling.   Skin:  Negative for rash and skin lesions.   Allergic/Immunologic: Negative for environmental allergies.   Neurological:  Positive for headache (chroniic). Negative for seizures and syncope.   Hematological:  Does not bruise/bleed easily.   Psychiatric/Behavioral:  Negative for suicidal  "ideas.         The following portions of the patient's history were reviewed and updated as appropriate: allergies, current medications, past family history, past medical history, past social history, past surgical history and problem list.    Current Outpatient Medications on File Prior to Visit   Medication Sig Dispense Refill    diclofenac (VOLTAREN) 75 MG EC tablet TAKE 1 TABLET BY MOUTH 2 TIMES A  tablet 0    escitalopram (LEXAPRO) 10 MG tablet Take 1 tablet by mouth Daily.      Etonogestrel (NEXPLANON SC) Inject  under the skin into the appropriate area as directed. Implanted August 30 of 2023      melatonin 5 MG tablet tablet Take 1 tablet by mouth.      propranolol (INDERAL) 10 MG tablet Take 1 tablet by mouth 2 (Two) Times a Day.      [DISCONTINUED] rizatriptan (Maxalt) 10 MG tablet Take 1 tablet by mouth 1 (One) Time As Needed for Migraine for up to 1 dose. May repeat in 2 hours if needed 9 tablet 5    [DISCONTINUED] tiZANidine (ZANAFLEX) 2 MG tablet TAKE 1 TABLET BY MOUTH EVERY 8 HOURS AS NEEDED FOR MUSCLE SPASM 90 tablet 0    [DISCONTINUED] Loratadine 10 MG capsule Take  by mouth. (Patient not taking: Reported on 5/21/2024)       No current facility-administered medications on file prior to visit.       Allergies   Allergen Reactions    Cephalexin         Visit Vitals  /86   Pulse 76   Temp 98 °F (36.7 °C) (Infrared)   Ht 157.5 cm (62.01\")   Wt 121 kg (266 lb)   SpO2 98%   BMI 48.64 kg/m²        Physical Exam  Constitutional:       General: She is not in acute distress.     Appearance: She is well-developed. She is not diaphoretic.   HENT:      Head: Atraumatic.   Cardiovascular:      Rate and Rhythm: Normal rate and regular rhythm.      Heart sounds: Normal heart sounds. No murmur heard.     No friction rub. No gallop.   Pulmonary:      Effort: Pulmonary effort is normal. No respiratory distress.      Breath sounds: Normal breath sounds. No stridor. No wheezing, rhonchi or rales. "   Musculoskeletal:      Cervical back: Normal range of motion and neck supple.   Skin:     General: Skin is warm and dry.   Neurological:      Mental Status: She is alert and oriented to person, place, and time.   Psychiatric:         Behavior: Behavior normal.          Results for orders placed or performed in visit on 02/29/24   Hemoglobin A1c    Specimen: Blood    Blood  Release to vandana   Result Value Ref Range    Hemoglobin A1C 5.70 (H) 4.80 - 5.60 %   Lipid Panel    Specimen: Blood    Blood  Release to vandana   Result Value Ref Range    Total Cholesterol 207 (H) 0 - 200 mg/dL    Triglycerides 58 0 - 150 mg/dL    HDL Cholesterol 35 (L) 40 - 60 mg/dL    VLDL Cholesterol Carlos 10 5 - 40 mg/dL    LDL Chol Calc (NIH) 162 (H) 0 - 100 mg/dL   TSH Rfx On Abnormal To Free T4    Specimen: Blood    Blood  Release to vandana   Result Value Ref Range    TSH 1.870 0.270 - 4.200 uIU/mL   Comprehensive Metabolic Panel    Specimen: Blood    Blood  Release to vandana   Result Value Ref Range    Glucose 94 65 - 99 mg/dL    BUN 8 6 - 20 mg/dL    Creatinine 0.92 0.57 - 1.00 mg/dL    EGFR Result 84.0 >60.0 mL/min/1.73    BUN/Creatinine Ratio 8.7 7.0 - 25.0    Sodium 139 136 - 145 mmol/L    Potassium 4.5 3.5 - 5.2 mmol/L    Chloride 102 98 - 107 mmol/L    Total CO2 25.2 22.0 - 29.0 mmol/L    Calcium 9.1 8.6 - 10.5 mg/dL    Total Protein 7.1 6.0 - 8.5 g/dL    Albumin 4.4 3.5 - 5.2 g/dL    Globulin 2.7 gm/dL    A/G Ratio 1.6 g/dL    Total Bilirubin 0.4 0.0 - 1.2 mg/dL    Alkaline Phosphatase 79 39 - 117 U/L    AST (SGOT) 26 1 - 32 U/L    ALT (SGPT) 51 (H) 1 - 33 U/L   CBC & Differential    Specimen: Blood    Blood  Release to vandana   Result Value Ref Range    WBC 7.18 3.40 - 10.80 10*3/mm3    RBC 4.86 3.77 - 5.28 10*6/mm3    Hemoglobin 14.0 12.0 - 15.9 g/dL    Hematocrit 42.0 34.0 - 46.6 %    MCV 86.4 79.0 - 97.0 fL    MCH 28.8 26.6 - 33.0 pg    MCHC 33.3 31.5 - 35.7 g/dL    RDW 13.5 12.3 - 15.4 %    Platelets 354 140 - 450 10*3/mm3    Neutrophil  Rel % 54.4 42.7 - 76.0 %    Lymphocyte Rel % 30.8 19.6 - 45.3 %    Monocyte Rel % 9.7 5.0 - 12.0 %    Eosinophil Rel % 4.3 0.3 - 6.2 %    Basophil Rel % 0.7 0.0 - 1.5 %    Neutrophils Absolute 3.90 1.70 - 7.00 10*3/mm3    Lymphocytes Absolute 2.21 0.70 - 3.10 10*3/mm3    Monocytes Absolute 0.70 0.10 - 0.90 10*3/mm3    Eosinophils Absolute 0.31 0.00 - 0.40 10*3/mm3    Basophils Absolute 0.05 0.00 - 0.20 10*3/mm3    Immature Granulocyte Rel % 0.1 0.0 - 0.5 %    Immature Grans Absolute 0.01 0.00 - 0.05 10*3/mm3    nRBC 0.0 0.0 - 0.2 /100 WBC        Problems Addressed this Visit          Mental Health    Depression     Patient's depression is a recurrent episode that is moderate without psychosis. Depression is in partial remission and stable.    Plan:   Continue current medication therapy     Followup in 3 months.             Musculoskeletal and Injuries    Myalgia    Relevant Medications    baclofen (LIORESAL) 10 MG tablet    Fibromyalgia - Primary     Pain has shown some improvement but she does not feel that the medications were working as well as they were when she restarted them.  She will continue diclofenac 75 mg twice daily.  Will discontinue the Zanaflex and switch to baclofen.  Patient has appointment with Dr. Garrido for OMT on 6/4/2024.  She was encouraged to keep this appointment and we will schedule follow-up with myself or Dr. Garrido after that appointment.         Relevant Medications    baclofen (LIORESAL) 10 MG tablet       Neuro    Migraine     Headaches are  the amount headache days patient is having this stable but she does not feel that the Maxalt is working as well as it did to resolve the migraines .    Plan:  Patient will continue Lexapro and propranolol.  Will switch Maxalt to Imitrex..     Discussed medication dosage, use, side effects, and goals of treatment in detail.    Discussed monitoring symptoms and use of quick-relief medications and maintenance medication.    General Treatment Goals:    symptom prevention  minimize work absence  minimizing limitation in activity  prevention of exacerbations  decrease use of ER/inpatient care  minimization of adverse effects of treatment    Followup in 3 months                     Relevant Medications    baclofen (LIORESAL) 10 MG tablet    SUMAtriptan (Imitrex) 100 MG tablet     Diagnoses         Codes Comments    Fibromyalgia    -  Primary ICD-10-CM: M79.7  ICD-9-CM: 729.1     Myalgia     ICD-10-CM: M79.10  ICD-9-CM: 729.1     Chronic migraine without aura without status migrainosus, not intractable     ICD-10-CM: G43.709  ICD-9-CM: 346.70     Moderate episode of recurrent major depressive disorder     ICD-10-CM: F33.1  ICD-9-CM: 296.32             Return for Next scheduled follow up.    Filipe Hernandez MD   5/21/2024

## 2024-05-21 NOTE — ASSESSMENT & PLAN NOTE
Patient's depression is a recurrent episode that is moderate without psychosis. Depression is in partial remission and stable.    Plan:   Continue current medication therapy     Followup in 3 months.

## 2024-05-21 NOTE — ASSESSMENT & PLAN NOTE
Headaches are  the amount headache days patient is having this stable but she does not feel that the Maxalt is working as well as it did to resolve the migraines .    Plan:  Patient will continue Lexapro and propranolol.  Will switch Maxalt to Imitrex..     Discussed medication dosage, use, side effects, and goals of treatment in detail.    Discussed monitoring symptoms and use of quick-relief medications and maintenance medication.    General Treatment Goals:   symptom prevention  minimize work absence  minimizing limitation in activity  prevention of exacerbations  decrease use of ER/inpatient care  minimization of adverse effects of treatment    Followup in 3 months

## 2024-06-04 ENCOUNTER — OFFICE VISIT (OUTPATIENT)
Dept: FAMILY MEDICINE CLINIC | Facility: CLINIC | Age: 34
End: 2024-06-04
Payer: MEDICAID

## 2024-06-04 VITALS
TEMPERATURE: 98 F | HEART RATE: 82 BPM | SYSTOLIC BLOOD PRESSURE: 138 MMHG | BODY MASS INDEX: 49.32 KG/M2 | DIASTOLIC BLOOD PRESSURE: 98 MMHG | WEIGHT: 268 LBS | HEIGHT: 62 IN

## 2024-06-04 DIAGNOSIS — M99.02 SOMATIC DYSFUNCTION OF THORACIC REGION: ICD-10-CM

## 2024-06-04 DIAGNOSIS — M99.04 SOMATIC DYSFUNCTION OF SACRAL REGION: ICD-10-CM

## 2024-06-04 DIAGNOSIS — M54.50 CHRONIC MIDLINE LOW BACK PAIN WITHOUT SCIATICA: ICD-10-CM

## 2024-06-04 DIAGNOSIS — G89.29 CHRONIC MIDLINE LOW BACK PAIN WITHOUT SCIATICA: ICD-10-CM

## 2024-06-04 DIAGNOSIS — M99.03 SOMATIC DYSFUNCTION OF LUMBAR REGION: ICD-10-CM

## 2024-06-04 DIAGNOSIS — M79.7 FIBROMYALGIA: Primary | ICD-10-CM

## 2024-06-04 PROCEDURE — 99213 OFFICE O/P EST LOW 20 MIN: CPT | Performed by: FAMILY MEDICINE

## 2024-06-04 PROCEDURE — 98926 OSTEOPATH MANJ 3-4 REGIONS: CPT | Performed by: FAMILY MEDICINE

## 2024-06-04 PROCEDURE — 1125F AMNT PAIN NOTED PAIN PRSNT: CPT | Performed by: FAMILY MEDICINE

## 2024-06-04 PROCEDURE — 1160F RVW MEDS BY RX/DR IN RCRD: CPT | Performed by: FAMILY MEDICINE

## 2024-06-04 PROCEDURE — 1159F MED LIST DOCD IN RCRD: CPT | Performed by: FAMILY MEDICINE

## 2024-06-04 NOTE — PROGRESS NOTES
"Follow Up Visit    Patient: Nicole Jo  YOB: 1990  Date of Encounter: 06/04/2024  PCP: Filipe Hernandez MD      Subjective   Nicole Jo is a 33 y.o. female who presents to the office today for evaluation of Fibromyalgia (Sometimes feels like joints are dislocated and pop/crackle)      Chief Complaint   Patient presents with    Fibromyalgia     Sometimes feels like joints are dislocated and pop/crackle       Fibromyalgia        Ms. Jo is a pleasant 33 year old female who presents today to follow up on Fibromyalgia. She was diagnosed in August of 2023 - current medications include Diclofenac and Baclofen. She also uses CBD gummies and cannabis vape as needed. She note a lot of diffuse myalgias and low back pain. She reports feeling \"fine\" today but has a lot of \"bad days\". These are more than half of the days.     She presents today for OMT evaluation, she has never had manipulation treatments  OMT or Chiropractic therapy.      Patient Active Problem List   Diagnosis    Depression    Insomnia    PCOS (polycystic ovarian syndrome)    Migraine    Seasonal affective disorder    Environmental and seasonal allergies    Class 3 severe obesity due to excess calories without serious comorbidity with body mass index (BMI) of 45.0 to 49.9 in adult    Chronic pain of multiple joints    Myalgia    NSAID long-term use    Elevated TSH    Well adult exam    Fibromyalgia       Visit Vitals  /98   Pulse 82   Temp 98 °F (36.7 °C) (Infrared)   Ht 157.5 cm (62.01\")   Wt 122 kg (268 lb)   BMI 49.01 kg/m²     33 y.o.female    Physical Exam  Vitals reviewed.   Constitutional:       Appearance: Normal appearance.   Cardiovascular:      Rate and Rhythm: Normal rate.   Pulmonary:      Effort: Pulmonary effort is normal.   Neurological:      General: No focal deficit present.      Mental Status: She is alert. Mental status is at baseline.          Radiology Results:    No radiology results for the last 30 " days.    OMT Procedure  Indications: TART findings, muscle spasm, pain    Risks and benefits discussed and patient gave verbal consent to treat    Regions treated: T- Spine, L-Spine, Pelvis, and Sacrum    Findings: Thoracic Spine T12 FRS L, Lumbar Spine L5 ERS L, Pelvis Right Posterior Innominate Rotation, or Sacrum R/L Rotation    Modalities: Muscle Energy, Direct Myofascial Release, and Indirect Myofascial Release    Patient reports decreased pain, improved range of motion, and improved functionality after treatment. There were no adverse effects.      Assessment & Plan   Diagnoses and all orders for this visit:    1. Fibromyalgia (Primary)  -     Ambulatory Referral to Physical Therapy    2. Chronic midline low back pain without sciatica  -     Ambulatory Referral to Physical Therapy    3. Somatic dysfunction of lumbar region    4. Somatic dysfunction of thoracic region    5. Somatic dysfunction of sacral region           Discussion:    Discussed the benefits and risk of OMT with patient.  They are in agreement to attempt OMT for their treatment of their pain/discomfort.  Patient tolerated treatment well throughout the course.  Advised that there can be some mild soreness after treatment persisting for 48 to 72 hours.  Advised to drink plenty of water for this duration to combat this.  Any and all questions were answered and patient was comfortable with exam/treatment.            Transcribed from ambient dictation for Shanti Garrido DO by Shanti Garrido DO.  06/04/24   09:14 EDT      Dictated Utilizing Dragon Dictation: Part of this note may be an electronic transcription/translation of spoken language to printed text using the Dragon Dictation System.        Shanti Garrido D.O.  [facility]

## 2024-06-06 ENCOUNTER — TELEPHONE (OUTPATIENT)
Dept: FAMILY MEDICINE CLINIC | Facility: CLINIC | Age: 34
End: 2024-06-06
Payer: MEDICAID

## 2024-06-06 NOTE — TELEPHONE ENCOUNTER
Patient is needing a letter stating she is being seen and treated for depression, anxiety, fibromyalgia and migraines. Is this something you can provide as you saw patient on 6/4?

## 2024-06-06 NOTE — TELEPHONE ENCOUNTER
Caller: Nicole Jo    Relationship: Self    Best call back number: [phone]     What form or medical record are you requesting:   MEDICAL DOCUMENTATION OF DEPRESSION FIBROMYALGIA AND MIGRAINES, AND ANXIETY    Who is requesting this form or medical record from you: [facility]    How would you like to receive the form or medical records (pick-up, mail, fax): FAX  If fax, what is the fax number:   [phone]   ATTN: CHITO RICCI    Timeframe paperwork needed: ASAP    Additional notes: PLEASE FAX.

## 2024-06-18 ENCOUNTER — OFFICE VISIT (OUTPATIENT)
Dept: FAMILY MEDICINE CLINIC | Facility: CLINIC | Age: 34
End: 2024-06-18
Payer: MEDICAID

## 2024-06-18 VITALS
HEIGHT: 62 IN | BODY MASS INDEX: 48.58 KG/M2 | OXYGEN SATURATION: 99 % | WEIGHT: 264 LBS | TEMPERATURE: 98.4 F | HEART RATE: 75 BPM | SYSTOLIC BLOOD PRESSURE: 138 MMHG | DIASTOLIC BLOOD PRESSURE: 82 MMHG

## 2024-06-18 DIAGNOSIS — M99.07 SOMATIC DYSFUNCTION OF BOTH UPPER EXTREMITIES: ICD-10-CM

## 2024-06-18 DIAGNOSIS — M99.08 SOMATIC DYSFUNCTION OF RIB: ICD-10-CM

## 2024-06-18 DIAGNOSIS — M99.01 SOMATIC DYSFUNCTION OF CERVICAL REGION: ICD-10-CM

## 2024-06-18 DIAGNOSIS — M99.03 SOMATIC DYSFUNCTION OF LUMBAR REGION: Primary | ICD-10-CM

## 2024-06-18 DIAGNOSIS — M79.7 FIBROMYALGIA: ICD-10-CM

## 2024-06-18 DIAGNOSIS — M99.02 SOMATIC DYSFUNCTION OF THORACIC REGION: ICD-10-CM

## 2024-06-18 DIAGNOSIS — M79.10 MYALGIA: ICD-10-CM

## 2024-06-18 PROCEDURE — 1160F RVW MEDS BY RX/DR IN RCRD: CPT | Performed by: FAMILY MEDICINE

## 2024-06-18 PROCEDURE — 1125F AMNT PAIN NOTED PAIN PRSNT: CPT | Performed by: FAMILY MEDICINE

## 2024-06-18 PROCEDURE — 98927 OSTEOPATH MANJ 5-6 REGIONS: CPT | Performed by: FAMILY MEDICINE

## 2024-06-18 PROCEDURE — 1159F MED LIST DOCD IN RCRD: CPT | Performed by: FAMILY MEDICINE

## 2024-06-18 RX ORDER — BACLOFEN 20 MG/1
20 TABLET ORAL 3 TIMES DAILY
Qty: 90 TABLET | Refills: 1 | Status: SHIPPED | OUTPATIENT
Start: 2024-06-18

## 2024-06-18 NOTE — PROGRESS NOTES
"Follow Up Visit    Patient: Nicole Jo  YOB: 1990  Date of Encounter: 06/04/2024  PCP: Shanti Garrido DO Subjective   Nicole oJ is a 33 y.o. female who presents to the office today for evaluation of medication adjustment      Chief Complaint   Patient presents with    medication adjustment       Fibromyalgia        Ms. Jo is a pleasant 33 year old female who presents today to follow up on Fibromyalgia. She was diagnosed in August of 2023 - current medications include Diclofenac and Baclofen. She also uses CBD gummies and cannabis vape as needed. She note a lot of diffuse myalgias and low back pain. She reports feeling \"fine\" today but has a lot of \"bad days\". These are more than half of the days.     She presents today for OMT evaluation and follow up. She was treated about two weeks ago. She reports some soreness after treatment for a few days. She thinks her range of motion has been slightly improved after.             Patient Active Problem List   Diagnosis    Depression    Insomnia    PCOS (polycystic ovarian syndrome)    Migraine    Seasonal affective disorder    Environmental and seasonal allergies    Class 3 severe obesity due to excess calories without serious comorbidity with body mass index (BMI) of 45.0 to 49.9 in adult    Chronic pain of multiple joints    Myalgia    NSAID long-term use    Elevated TSH    Well adult exam    Fibromyalgia       Visit Vitals  /82   Pulse 75   Temp 98.4 °F (36.9 °C) (Infrared)   Ht 157.5 cm (62.01\")   Wt 120 kg (264 lb)   SpO2 99%   BMI 48.27 kg/m²     33 y.o.female    Physical Exam  Vitals reviewed.   Constitutional:       Appearance: Normal appearance.   Cardiovascular:      Rate and Rhythm: Normal rate.   Pulmonary:      Effort: Pulmonary effort is normal.   Neurological:      General: No focal deficit present.      Mental Status: She is alert. Mental status is at baseline.          Radiology Results:    No radiology results for the " last 30 days.    OMT Procedure  Indications: TART findings, muscle spasm, pain    Risks and benefits discussed and patient gave verbal consent to treat    Regions treated: C-Spine, Ribs, Upper Extremity, T- Spine, and L-Spine    Findings: Cervical Spine Generalized cervical motion restriction or C7 ERS L, Upper Extremity Left carpal restriction or Right Carpal restriction, Ribs 3-5 Right inhaled, Thoracic Spine T12 FRS R or FRS L, or Lumbar Spine L5 ERS L    Modalities: Muscle Energy, Direct Myofascial Release, and Indirect Myofascial Release    Patient reports decreased pain, improved range of motion, and improved functionality after treatment. There were no adverse effects.      Assessment & Plan   Diagnoses and all orders for this visit:    1. Somatic dysfunction of lumbar region (Primary)    2. Fibromyalgia  -     baclofen (LIORESAL) 20 MG tablet; Take 1 tablet by mouth 3 (Three) Times a Day.  Dispense: 90 tablet; Refill: 1    3. Myalgia  -     baclofen (LIORESAL) 20 MG tablet; Take 1 tablet by mouth 3 (Three) Times a Day.  Dispense: 90 tablet; Refill: 1    4. Somatic dysfunction of thoracic region    5. Somatic dysfunction of both upper extremities    6. Somatic dysfunction of rib    7. Somatic dysfunction of cervical region             Discussion:    Discussed the benefits and risk of OMT with patient.  They are in agreement to attempt OMT for their treatment of their pain/discomfort.  Patient tolerated treatment well throughout the course.  Advised that there can be some mild soreness after treatment persisting for 48 to 72 hours.  Advised to drink plenty of water for this duration to combat this.  Any and all questions were answered and patient was comfortable with exam/treatment.            Transcribed from ambient dictation for Shanti Garrido DO by Shanti Garrido DO.  06/04/24   09:14 EDT      Dictated Utilizing Dragon Dictation: Part of this note may be an electronic transcription/translation of spoken  language to printed text using the Dragon Dictation System.        Shanti Garrido D.O.  [facility] [place]

## 2024-08-02 ENCOUNTER — OFFICE VISIT (OUTPATIENT)
Dept: FAMILY MEDICINE CLINIC | Facility: CLINIC | Age: 34
End: 2024-08-02
Payer: MEDICAID

## 2024-08-02 VITALS
TEMPERATURE: 98.2 F | WEIGHT: 267 LBS | BODY MASS INDEX: 49.13 KG/M2 | SYSTOLIC BLOOD PRESSURE: 126 MMHG | DIASTOLIC BLOOD PRESSURE: 96 MMHG | HEART RATE: 88 BPM | HEIGHT: 62 IN

## 2024-08-02 DIAGNOSIS — M79.7 FIBROMYALGIA: Primary | ICD-10-CM

## 2024-08-02 PROCEDURE — 1160F RVW MEDS BY RX/DR IN RCRD: CPT | Performed by: FAMILY MEDICINE

## 2024-08-02 PROCEDURE — 1125F AMNT PAIN NOTED PAIN PRSNT: CPT | Performed by: FAMILY MEDICINE

## 2024-08-02 PROCEDURE — 99214 OFFICE O/P EST MOD 30 MIN: CPT | Performed by: FAMILY MEDICINE

## 2024-08-02 PROCEDURE — 1159F MED LIST DOCD IN RCRD: CPT | Performed by: FAMILY MEDICINE

## 2024-08-02 RX ORDER — DULOXETIN HYDROCHLORIDE 30 MG/1
30 CAPSULE, DELAYED RELEASE ORAL DAILY
Qty: 30 CAPSULE | Refills: 2 | Status: SHIPPED | OUTPATIENT
Start: 2024-08-02

## 2024-08-02 NOTE — PROGRESS NOTES
"      Subjective     Chief Complaint  Fibromyalgia (Would like to stop baclofen. Auditory hallucinations)    Subjective          Nicole Jo is a 33 y.o. female who presents today to [facility] for follow up.    HPI:   History of Present Illness      Nicole presents today to follow up on fibromyalgia. At last visit, I started her on baclofen for muscle spasm and she reports that she having side effects of auditory hallucinations. She states that she has 5 cats but hearing 6. She has been \"hearing a man and a woman arguing at night.\"      For fibromyalgia - she has tried diclofenac, tizanidine and now baclofen.     She has a history of depression and is prescribed Lexapro with ok control. She has never taken Cymbalta for depression.     The following portions of the patient's history were reviewed and updated as appropriate: allergies, current medications, past family history, past medical history, past social history, past surgical history and problem list.    Objective     Objective     Allergy:   Allergies   Allergen Reactions    Baclofen Hallucinations    Cephalexin         Current Medications:   Current Outpatient Medications   Medication Sig Dispense Refill    diclofenac (VOLTAREN) 75 MG EC tablet TAKE 1 TABLET BY MOUTH 2 TIMES A  tablet 0    Etonogestrel (NEXPLANON SC) Inject  under the skin into the appropriate area as directed. Implanted August 30 of 2023      melatonin 5 MG tablet tablet Take 1 tablet by mouth.      propranolol (INDERAL) 10 MG tablet Take 1 tablet by mouth 2 (Two) Times a Day.      SUMAtriptan (Imitrex) 100 MG tablet Take one tablet at onset of headache. May repeat dose one time in 2 hours if headache not relieved. 9 tablet 3    DULoxetine (Cymbalta) 30 MG capsule Take 1 capsule by mouth Daily. 30 capsule 2     No current facility-administered medications for this visit.       Past Medical History:  Past Medical History:   Diagnosis Date    Acute " pharyngitis     ADHD (attention deficit hyperactivity disorder)     elementary school dx    Anxiety     Depression     Eustachian tube dysfunction     Fibromyalgia, primary     Hypertension     Insomnia     Joint pain, knee     Migraine     Premenstrual dysphoric disorder     Upper respiratory infection     Weight gain        Past Surgical History:  Past Surgical History:   Procedure Laterality Date    SALPINGECTOMY Bilateral     TONSILLECTOMY      WISDOM TOOTH EXTRACTION         Social History:  Social History     Socioeconomic History    Marital status:    Tobacco Use    Smoking status: Never     Passive exposure: Never    Smokeless tobacco: Never   Vaping Use    Vaping status: Every Day    Substances: THC (3 gram)    Devices: Disposable    Passive vaping exposure: Yes   Substance and Sexual Activity    Alcohol use: Not Currently     Comment: once or twice a month, if that    Drug use: Yes     Frequency: 7.0 times per week     Types: Marijuana     Comment: smoke/125mg gummy everyday for pain    Sexual activity: Not Currently     Partners: Female, Male     Birth control/protection: Surgical, Implant     Comment: bilateral saplingectomy       Family History:  Family History   Problem Relation Age of Onset    Bipolar disorder Mother     Arthritis Mother     Anxiety disorder Mother     Depression Mother     Irritable bowel syndrome Mother     Endometriosis Mother     LOGAN disease Mother     Cancer Mother         pancreatic cancer    Hyperlipidemia Mother     Mental illness Mother         bipolar/manic depressive    Miscarriages / Stillbirths Mother     Other Mother         chronic pain, ibs, acid reflux, endometriosis    Depression Father     Hypertension Father     Hyperlipidemia Father     Arthritis Father     Asthma Father     Diabetes Father     Mental illness Father         depression    ADD / ADHD Brother     Bipolar disorder Brother     Hepatitis Brother         C    Drug abuse Brother     ADD / ADHD  "Brother     Drug abuse Brother     Transient ischemic attack Maternal Grandmother     Stroke Maternal Grandmother     Hypertension Maternal Grandmother     Dementia Maternal Grandmother     Osteoporosis Maternal Grandmother     Other Maternal Grandmother         stroke, dementia, hpertension, osteoporosis    Lymphoma Maternal Grandfather     Meniere's disease Maternal Grandfather     Other Maternal Grandfather         non-hodgkins lymphoma    Arthritis Paternal Grandmother     Stroke Paternal Grandmother     Hypertension Paternal Grandmother     Other Paternal Grandmother         blood clot unknown site    Pancreatic cancer Paternal Grandfather     Hypertension Paternal Grandfather     Hyperlipidemia Paternal Grandfather     Diverticulosis Paternal Grandfather     Other Paternal Grandfather         cancer,  of heart attack       Vital Signs:   /96   Pulse 88   Temp 98.2 °F (36.8 °C) (Infrared)   Ht 157.5 cm (62.01\")   Wt 121 kg (267 lb)   BMI 48.82 kg/m²      Physical Exam:  Physical Exam  Vitals reviewed.   Constitutional:       Appearance: Normal appearance.   Cardiovascular:      Rate and Rhythm: Normal rate.   Pulmonary:      Effort: Pulmonary effort is normal.   Neurological:      Mental Status: She is alert. Mental status is at baseline.               PHQ-9 Score  PHQ-9 Total Score:       Lab Review  No visits with results within 3 Month(s) from this visit.   Latest known visit with results is:   Lab on 2024   Component Date Value Ref Range Status    Hemoglobin A1C 2024 5.70 (H)  4.80 - 5.60 % Final    Comment: Hemoglobin A1C Ranges:  Increased Risk for Diabetes  5.7% to 6.4%  Diabetes                     >= 6.5%  Diabetic Goal                < 7.0%      Total Cholesterol 2024 207 (H)  0 - 200 mg/dL Final    Comment: Cholesterol Reference Ranges  (U.S. Department of Health and Human Services ATP III  Classifications)  Desirable          <200 mg/dL  Borderline High    200-239 " mg/dL  High Risk          >240 mg/dL  Triglyceride Reference Ranges  (U.S. Department of Health and Human Services ATP III  Classifications)  Normal           <150 mg/dL  Borderline High  150-199 mg/dL  High             200-499 mg/dL  Very High        >500 mg/dL  HDL Reference Ranges  (U.S. Department of Health and Human Services ATP III  Classifications)  Low     <40 mg/dl (major risk factor for CHD)  High    >60 mg/dl ('negative' risk factor for CHD)  LDL Reference Ranges  (U.S. Department of Health and Human Services ATP III  Classifications)  Optimal          <100 mg/dL  Near Optimal     100-129 mg/dL  Borderline High  130-159 mg/dL  High             160-189 mg/dL  Very High        >189 mg/dL      Triglycerides 02/29/2024 58  0 - 150 mg/dL Final    HDL Cholesterol 02/29/2024 35 (L)  40 - 60 mg/dL Final    VLDL Cholesterol Carlos 02/29/2024 10  5 - 40 mg/dL Final    LDL Chol Calc (NIH) 02/29/2024 162 (H)  0 - 100 mg/dL Final    WBC 02/29/2024 7.18  3.40 - 10.80 10*3/mm3 Final    RBC 02/29/2024 4.86  3.77 - 5.28 10*6/mm3 Final    Hemoglobin 02/29/2024 14.0  12.0 - 15.9 g/dL Final    Hematocrit 02/29/2024 42.0  34.0 - 46.6 % Final    MCV 02/29/2024 86.4  79.0 - 97.0 fL Final    MCH 02/29/2024 28.8  26.6 - 33.0 pg Final    MCHC 02/29/2024 33.3  31.5 - 35.7 g/dL Final    RDW 02/29/2024 13.5  12.3 - 15.4 % Final    Platelets 02/29/2024 354  140 - 450 10*3/mm3 Final    Neutrophil Rel % 02/29/2024 54.4  42.7 - 76.0 % Final    Lymphocyte Rel % 02/29/2024 30.8  19.6 - 45.3 % Final    Monocyte Rel % 02/29/2024 9.7  5.0 - 12.0 % Final    Eosinophil Rel % 02/29/2024 4.3  0.3 - 6.2 % Final    Basophil Rel % 02/29/2024 0.7  0.0 - 1.5 % Final    Neutrophils Absolute 02/29/2024 3.90  1.70 - 7.00 10*3/mm3 Final    Lymphocytes Absolute 02/29/2024 2.21  0.70 - 3.10 10*3/mm3 Final    Monocytes Absolute 02/29/2024 0.70  0.10 - 0.90 10*3/mm3 Final    Eosinophils Absolute 02/29/2024 0.31  0.00 - 0.40 10*3/mm3 Final    Basophils Absolute  02/29/2024 0.05  0.00 - 0.20 10*3/mm3 Final    Immature Granulocyte Rel % 02/29/2024 0.1  0.0 - 0.5 % Final    Immature Grans Absolute 02/29/2024 0.01  0.00 - 0.05 10*3/mm3 Final    nRBC 02/29/2024 0.0  0.0 - 0.2 /100 WBC Final    TSH 02/29/2024 1.870  0.270 - 4.200 uIU/mL Final    Glucose 02/29/2024 94  65 - 99 mg/dL Final    BUN 02/29/2024 8  6 - 20 mg/dL Final    Creatinine 02/29/2024 0.92  0.57 - 1.00 mg/dL Final    EGFR Result 02/29/2024 84.0  >60.0 mL/min/1.73 Final    Comment: GFR Normal >60  Chronic Kidney Disease <60  Kidney Failure <15      BUN/Creatinine Ratio 02/29/2024 8.7  7.0 - 25.0 Final    Sodium 02/29/2024 139  136 - 145 mmol/L Final    Potassium 02/29/2024 4.5  3.5 - 5.2 mmol/L Final    Chloride 02/29/2024 102  98 - 107 mmol/L Final    Total CO2 02/29/2024 25.2  22.0 - 29.0 mmol/L Final    Calcium 02/29/2024 9.1  8.6 - 10.5 mg/dL Final    Total Protein 02/29/2024 7.1  6.0 - 8.5 g/dL Final    Albumin 02/29/2024 4.4  3.5 - 5.2 g/dL Final    Globulin 02/29/2024 2.7  gm/dL Final    A/G Ratio 02/29/2024 1.6  g/dL Final    Total Bilirubin 02/29/2024 0.4  0.0 - 1.2 mg/dL Final    Alkaline Phosphatase 02/29/2024 79  39 - 117 U/L Final    AST (SGOT) 02/29/2024 26  1 - 32 U/L Final    ALT (SGPT) 02/29/2024 51 (H)  1 - 33 U/L Final        Radiology Results        Assessment / Plan         Assessment and Plan   Diagnoses and all orders for this visit:    1. Fibromyalgia (Primary)  Assessment & Plan:  Stop baclofen.  Will also stop Lexapro and start Cymbalta 30 mg daily.    Orders:  -     DULoxetine (Cymbalta) 30 MG capsule; Take 1 capsule by mouth Daily.  Dispense: 30 capsule; Refill: 2        Discussed possible differential diagnoses, testing, treatment, recommended non-pharmacological interventions, risks, warning signs to monitor for that would indicate need for follow-up in clinic or ER. If no improvement with these regimens or you have new or worsening symptoms follow-up. Patient verbalizes understanding  and agreement with plan of care. Denies further needs or concerns.     Patient was given instructions and counseling regarding her condition and for health maintenance advised.                Health Maintenance  Health Maintenance:   Health Maintenance Due   Topic Date Due    INFLUENZA VACCINE  08/01/2024        Meds ordered during this visit  New Medications Ordered This Visit   Medications    DULoxetine (Cymbalta) 30 MG capsule     Sig: Take 1 capsule by mouth Daily.     Dispense:  30 capsule     Refill:  2       Meds stopped during this visit:  Medications Discontinued During This Encounter   Medication Reason    baclofen (LIORESAL) 20 MG tablet     escitalopram (LEXAPRO) 10 MG tablet         Visit Diagnoses    ICD-10-CM ICD-9-CM   1. Fibromyalgia  M79.7 729.1       Patient was given instructions and counseling regarding her condition or for health maintenance advice. Please see specific information pulled into the AVS if appropriate.     Follow Up   Return in about 4 weeks (around 8/30/2024) for followup and OMT .          This document has been electronically signed by Shanti Garrido DO   August 2, 2024 12:22 EDT    Dictated Utilizing Dragon Dictation: Part of this note may be an electronic transcription/translation of spoken language to printed text using the Dragon Dictation System.    Shanti Garrido D.O.  [facility] [place] [place]

## 2024-08-05 DIAGNOSIS — M25.50 CHRONIC PAIN OF MULTIPLE JOINTS: ICD-10-CM

## 2024-08-05 DIAGNOSIS — G89.29 CHRONIC PAIN OF MULTIPLE JOINTS: ICD-10-CM

## 2024-08-06 RX ORDER — DICLOFENAC SODIUM 75 MG/1
75 TABLET, DELAYED RELEASE ORAL 2 TIMES DAILY
Qty: 180 TABLET | Refills: 0 | Status: SHIPPED | OUTPATIENT
Start: 2024-08-06

## 2024-08-30 ENCOUNTER — OFFICE VISIT (OUTPATIENT)
Dept: FAMILY MEDICINE CLINIC | Facility: CLINIC | Age: 34
End: 2024-08-30
Payer: MEDICAID

## 2024-08-30 VITALS
SYSTOLIC BLOOD PRESSURE: 136 MMHG | BODY MASS INDEX: 48.4 KG/M2 | HEART RATE: 86 BPM | HEIGHT: 62 IN | WEIGHT: 263 LBS | DIASTOLIC BLOOD PRESSURE: 96 MMHG | TEMPERATURE: 97.5 F

## 2024-08-30 DIAGNOSIS — M62.838 MUSCLE SPASM: ICD-10-CM

## 2024-08-30 DIAGNOSIS — M99.04 SOMATIC DYSFUNCTION OF SACRAL REGION: ICD-10-CM

## 2024-08-30 DIAGNOSIS — M25.50 CHRONIC PAIN OF MULTIPLE JOINTS: ICD-10-CM

## 2024-08-30 DIAGNOSIS — M79.7 FIBROMYALGIA: Primary | ICD-10-CM

## 2024-08-30 DIAGNOSIS — M99.01 SOMATIC DYSFUNCTION OF CERVICAL REGION: ICD-10-CM

## 2024-08-30 DIAGNOSIS — M99.02 SOMATIC DYSFUNCTION OF THORACIC REGION: ICD-10-CM

## 2024-08-30 DIAGNOSIS — M99.07 SOMATIC DYSFUNCTION OF BOTH UPPER EXTREMITIES: ICD-10-CM

## 2024-08-30 DIAGNOSIS — M99.03 SOMATIC DYSFUNCTION OF LUMBAR REGION: ICD-10-CM

## 2024-08-30 DIAGNOSIS — G89.29 CHRONIC PAIN OF MULTIPLE JOINTS: ICD-10-CM

## 2024-08-30 DIAGNOSIS — M99.08 SOMATIC DYSFUNCTION OF RIB: ICD-10-CM

## 2024-08-30 RX ORDER — SPIRONOLACTONE 50 MG/1
50 TABLET, FILM COATED ORAL DAILY
COMMUNITY
Start: 2024-07-09

## 2024-08-30 RX ORDER — MELOXICAM 15 MG/1
15 TABLET ORAL DAILY
Qty: 30 TABLET | Refills: 2 | Status: SHIPPED | OUTPATIENT
Start: 2024-08-30

## 2024-08-30 RX ORDER — DULOXETIN HYDROCHLORIDE 60 MG/1
60 CAPSULE, DELAYED RELEASE ORAL DAILY
Qty: 30 CAPSULE | Refills: 5 | Status: SHIPPED | OUTPATIENT
Start: 2024-08-30

## 2024-08-30 NOTE — PROGRESS NOTES
"Follow Up Visit    Patient: Nicole Jo  YOB: 1990  Date of Encounter: 06/04/2024  PCP: Shanti Garrido DO Subjective   Nicole Jo is a 34 y.o. female who presents to the office today for evaluation of Depression      Chief Complaint   Patient presents with    Depression       Fibromyalgia    Depression  Her past medical history is significant for depression.       Ms. Jo is a pleasant 34 year old female who presents today to follow up on Fibromyalgia. She was diagnosed in August of 2023 - current medications include Diclofenac and Baclofen. She also uses CBD gummies and cannabis vape as needed. She note a lot of diffuse myalgias and low back pain. She reports feeling \"fine\" today but has a lot of \"bad days\". These are more than half of the days.     She presents today for OMT evaluation and follow up. She was treated a few months ago. She reports some soreness after treatment for a few days. She thinks her range of motion has been slightly improved after.     I had prescribed baclofen previously but this caused auditory hallucinations and once stopped, these resolved.       Patient Active Problem List   Diagnosis    Depression    Insomnia    PCOS (polycystic ovarian syndrome)    Migraine    Seasonal affective disorder    Environmental and seasonal allergies    Class 3 severe obesity due to excess calories without serious comorbidity with body mass index (BMI) of 45.0 to 49.9 in adult    Chronic pain of multiple joints    Myalgia    NSAID long-term use    Elevated TSH    Well adult exam    Fibromyalgia       Visit Vitals  /96   Pulse 86   Temp 97.5 °F (36.4 °C) (Infrared)   Ht 157.5 cm (62.01\")   Wt 119 kg (263 lb)   BMI 48.09 kg/m²     34 y.o.female    Physical Exam  Vitals reviewed.   Constitutional:       Appearance: Normal appearance.   Cardiovascular:      Rate and Rhythm: Normal rate.   Pulmonary:      Effort: Pulmonary effort is normal.   Neurological:      General: No focal " deficit present.      Mental Status: She is alert. Mental status is at baseline.          Radiology Results:    No radiology results for the last 30 days.    OMT Procedure  Indications: TART findings, muscle spasm, pain    Risks and benefits discussed and patient gave verbal consent to treat    Regions treated: C-Spine, Ribs, Upper Extremity, T- Spine, L-Spine, and Sacrum    Findings: Cervical Spine Generalized cervical motion restriction, Suboccipital restriction, or C7 ERS L, Upper Extremity Left shoulder restriction, Ribs 6-10 Left inhaled, Thoracic Spine T7 ERS L or T12 FRS R, Lumbar Spine L5 ERS L, or Sacrum R/L Rotation    Modalities: Muscle Energy, Direct Myofascial Release, and Indirect Myofascial Release    Patient reports decreased pain, improved range of motion, and improved functionality after treatment. There were no adverse effects.      Assessment & Plan   Diagnoses and all orders for this visit:    1. Fibromyalgia (Primary)  -     DULoxetine (Cymbalta) 60 MG capsule; Take 1 capsule by mouth Daily.  Dispense: 30 capsule; Refill: 5    2. Muscle spasm  -     Magnesium Glycinate 120 MG capsule; Take 120 mg by mouth Daily.  Dispense: 30 capsule; Refill: 5    3. Chronic pain of multiple joints  -     meloxicam (Mobic) 15 MG tablet; Take 1 tablet by mouth Daily.  Dispense: 30 tablet; Refill: 2    4. Somatic dysfunction of lumbar region    5. Somatic dysfunction of thoracic region    6. Somatic dysfunction of both upper extremities    7. Somatic dysfunction of rib    8. Somatic dysfunction of cervical region    9. Somatic dysfunction of sacral region      Increasing Cymbalta to 60 mg daily.  Advised taking magnesium for muscle spasms in place of muscle relaxants.     Discussed the benefits and risk of OMT with patient.  They are in agreement to attempt OMT for their treatment of their pain/discomfort.  Patient tolerated treatment well throughout the course.  Advised that there can be some mild soreness after  treatment persisting for 48 to 72 hours.  Advised to drink plenty of water for this duration to combat this.  Any and all questions were answered and patient was comfortable with exam/treatment.             Transcribed from ambient dictation for Shanti Garrido DO by Shanti Garrido DO.      Dictated Utilizing Dragon Dictation: Part of this note may be an electronic transcription/translation of spoken language to printed text using the Dragon Dictation System    Shanti Garrido D.O.  [facility] [place] [facility] [place]

## 2024-09-16 ENCOUNTER — TELEPHONE (OUTPATIENT)
Dept: FAMILY MEDICINE CLINIC | Facility: CLINIC | Age: 34
End: 2024-09-16
Payer: MEDICAID

## 2024-09-30 ENCOUNTER — OFFICE VISIT (OUTPATIENT)
Dept: FAMILY MEDICINE CLINIC | Facility: CLINIC | Age: 34
End: 2024-09-30
Payer: MEDICAID

## 2024-09-30 VITALS
OXYGEN SATURATION: 98 % | BODY MASS INDEX: 48.25 KG/M2 | HEIGHT: 62 IN | HEART RATE: 86 BPM | DIASTOLIC BLOOD PRESSURE: 68 MMHG | SYSTOLIC BLOOD PRESSURE: 110 MMHG | TEMPERATURE: 98.2 F | WEIGHT: 262.2 LBS

## 2024-09-30 DIAGNOSIS — Z23 IMMUNIZATION DUE: ICD-10-CM

## 2024-09-30 DIAGNOSIS — M79.7 FIBROMYALGIA: Primary | ICD-10-CM

## 2024-09-30 PROCEDURE — 1160F RVW MEDS BY RX/DR IN RCRD: CPT | Performed by: FAMILY MEDICINE

## 2024-09-30 PROCEDURE — 99214 OFFICE O/P EST MOD 30 MIN: CPT | Performed by: FAMILY MEDICINE

## 2024-09-30 PROCEDURE — 90656 IIV3 VACC NO PRSV 0.5 ML IM: CPT | Performed by: FAMILY MEDICINE

## 2024-09-30 PROCEDURE — 1159F MED LIST DOCD IN RCRD: CPT | Performed by: FAMILY MEDICINE

## 2024-09-30 PROCEDURE — 90471 IMMUNIZATION ADMIN: CPT | Performed by: FAMILY MEDICINE

## 2024-09-30 PROCEDURE — 1125F AMNT PAIN NOTED PAIN PRSNT: CPT | Performed by: FAMILY MEDICINE

## 2024-09-30 RX ORDER — CELECOXIB 100 MG/1
100 CAPSULE ORAL 2 TIMES DAILY
Qty: 60 CAPSULE | Refills: 2 | Status: SHIPPED | OUTPATIENT
Start: 2024-09-30

## 2024-09-30 RX ORDER — PREDNISONE 10 MG/1
TABLET ORAL
Qty: 65 TABLET | Refills: 0 | Status: SHIPPED | OUTPATIENT
Start: 2024-09-30 | End: 2024-10-21

## 2024-09-30 RX ORDER — CYCLOBENZAPRINE HCL 10 MG
10 TABLET ORAL 2 TIMES DAILY PRN
Qty: 60 TABLET | Refills: 1 | Status: SHIPPED | OUTPATIENT
Start: 2024-09-30

## 2024-09-30 NOTE — PROGRESS NOTES
"      Subjective     Chief Complaint  No chief complaint on file.    Subjective          Nicole Jo is a 34 y.o. female who presents today to [facility] for follow up.    HPI:   History of Present Illness    Ms. Jo is a pleasant 34 year old female who presents today to follow up on Fibromyalgia. She was diagnosed in August of 2023 - current medications include Diclofenac and Baclofen. She also uses CBD gummies and cannabis vape as needed. She note a lot of diffuse myalgias and low back pain. She reports feeling \"fine\" today but has a lot of \"bad days\".     Diclofenac and Mobic have not been beneficial as far as an antiinflammatory aspect.   I had also increased Cymbalta to 60 mg daily. I had prescribed baclofen previously but this caused auditory hallucinations and once stopped, these resolved. Tizanidine caused her to feel too sleepy.  Therefore, I suggested magnesium. Insurance would not approve this due to her insurance not covering otc meds.  Cymbalta hasn't helped with the pain etc but has improved the depression aspect.     She presents today for OMT evaluation and follow up. She was treated about a month ago. She thinks her range of motion has been slightly improved after for a few days.        The following portions of the patient's history were reviewed and updated as appropriate: allergies, current medications, past family history, past medical history, past social history, past surgical history and problem list.    Objective     Objective     Allergy:   Allergies   Allergen Reactions    Baclofen Hallucinations    Cephalexin     Diclofenac Hallucinations        Current Medications:   Current Outpatient Medications   Medication Sig Dispense Refill    DULoxetine (Cymbalta) 60 MG capsule Take 1 capsule by mouth Daily. 30 capsule 5    Etonogestrel (NEXPLANON SC) Inject  under the skin into the appropriate area as directed. Implanted August 30 of 2023      propranolol " (INDERAL) 10 MG tablet Take 1 tablet by mouth 2 (Two) Times a Day.      spironolactone (ALDACTONE) 50 MG tablet Take 1 tablet by mouth Daily.      SUMAtriptan (Imitrex) 100 MG tablet Take one tablet at onset of headache. May repeat dose one time in 2 hours if headache not relieved. 9 tablet 3    celecoxib (CeleBREX) 100 MG capsule Take 1 capsule by mouth 2 (Two) Times a Day. 60 capsule 2    cyclobenzaprine (FLEXERIL) 10 MG tablet Take 1 tablet by mouth 2 (Two) Times a Day As Needed for Muscle Spasms. 60 tablet 1    predniSONE (DELTASONE) 10 MG tablet Take 6 tablets by mouth Daily for 3 days, THEN 5 tablets Daily for 3 days, THEN 4 tablets Daily for 3 days, THEN 3 tablets Daily for 3 days, THEN 2 tablets Daily for 3 days, THEN 1 tablet Daily for 3 days, THEN 0.5 tablets Daily for 3 days. 65 tablet 0     No current facility-administered medications for this visit.       Past Medical History:  Past Medical History:   Diagnosis Date    Acute pharyngitis     ADHD (attention deficit hyperactivity disorder)     elementary school dx    Anxiety     Depression     Eustachian tube dysfunction     Fibromyalgia, primary     Hypertension     Insomnia     Joint pain, knee     Migraine     Premenstrual dysphoric disorder     Upper respiratory infection     Weight gain        Past Surgical History:  Past Surgical History:   Procedure Laterality Date    SALPINGECTOMY Bilateral     TONSILLECTOMY      WISDOM TOOTH EXTRACTION         Social History:  Social History     Socioeconomic History    Marital status:    Tobacco Use    Smoking status: Never     Passive exposure: Never    Smokeless tobacco: Never   Vaping Use    Vaping status: Every Day    Substances: THC (3 gram)    Devices: Disposable    Passive vaping exposure: Yes   Substance and Sexual Activity    Alcohol use: Not Currently     Comment: once or twice a month, if that    Drug use: Yes     Frequency: 7.0 times per week     Types: Marijuana     Comment: smoke/125mg  "gummy everyday for pain    Sexual activity: Not Currently     Partners: Female, Male     Birth control/protection: Surgical, Implant     Comment: bilateral saplingectomy       Family History:  Family History   Problem Relation Age of Onset    Bipolar disorder Mother     Arthritis Mother     Anxiety disorder Mother     Depression Mother     Irritable bowel syndrome Mother     Endometriosis Mother     LOGAN disease Mother     Cancer Mother         pancreatic cancer    Hyperlipidemia Mother     Mental illness Mother         bipolar/manic depressive    Miscarriages / Stillbirths Mother     Other Mother         chronic pain, ibs, acid reflux, endometriosis    Depression Father     Hypertension Father     Hyperlipidemia Father     Arthritis Father     Asthma Father     Diabetes Father     Mental illness Father         depression    ADD / ADHD Brother     Bipolar disorder Brother     Hepatitis Brother         C    Drug abuse Brother     ADD / ADHD Brother     Drug abuse Brother     Transient ischemic attack Maternal Grandmother     Stroke Maternal Grandmother     Hypertension Maternal Grandmother     Dementia Maternal Grandmother     Osteoporosis Maternal Grandmother     Other Maternal Grandmother         stroke, dementia, hpertension, osteoporosis    Lymphoma Maternal Grandfather     Meniere's disease Maternal Grandfather     Other Maternal Grandfather         non-hodgkins lymphoma    Arthritis Paternal Grandmother     Stroke Paternal Grandmother     Hypertension Paternal Grandmother     Other Paternal Grandmother         blood clot unknown site    Pancreatic cancer Paternal Grandfather     Hypertension Paternal Grandfather     Hyperlipidemia Paternal Grandfather     Diverticulosis Paternal Grandfather     Other Paternal Grandfather         cancer,  of heart attack       Vital Signs:   /68   Pulse 86   Temp 98.2 °F (36.8 °C) (Temporal)   Ht 157.5 cm (62.01\")   Wt 119 kg (262 lb 3.2 oz)   SpO2 98%   BMI 47.94 " kg/m²      Physical Exam:  Physical Exam  Vitals reviewed.   Constitutional:       Appearance: Normal appearance. She is obese.   Cardiovascular:      Rate and Rhythm: Normal rate.   Pulmonary:      Effort: Pulmonary effort is normal.   Musculoskeletal:      Comments: Very tender to palpation in multiple areas of the lumbar spine    Neurological:      Mental Status: She is alert. Mental status is at baseline.               PHQ-9 Score  PHQ-9 Total Score:       Lab Review  No visits with results within 3 Month(s) from this visit.   Latest known visit with results is:   Lab on 02/29/2024   Component Date Value Ref Range Status    Hemoglobin A1C 02/29/2024 5.70 (H)  4.80 - 5.60 % Final    Comment: Hemoglobin A1C Ranges:  Increased Risk for Diabetes  5.7% to 6.4%  Diabetes                     >= 6.5%  Diabetic Goal                < 7.0%      Total Cholesterol 02/29/2024 207 (H)  0 - 200 mg/dL Final    Comment: Cholesterol Reference Ranges  (U.S. Department of Health and Human Services ATP III  Classifications)  Desirable          <200 mg/dL  Borderline High    200-239 mg/dL  High Risk          >240 mg/dL  Triglyceride Reference Ranges  (U.S. Department of Health and Human Services ATP III  Classifications)  Normal           <150 mg/dL  Borderline High  150-199 mg/dL  High             200-499 mg/dL  Very High        >500 mg/dL  HDL Reference Ranges  (U.S. Department of Health and Human Services ATP III  Classifications)  Low     <40 mg/dl (major risk factor for CHD)  High    >60 mg/dl ('negative' risk factor for CHD)  LDL Reference Ranges  (U.S. Department of Health and Human Services ATP III  Classifications)  Optimal          <100 mg/dL  Near Optimal     100-129 mg/dL  Borderline High  130-159 mg/dL  High             160-189 mg/dL  Very High        >189 mg/dL      Triglycerides 02/29/2024 58  0 - 150 mg/dL Final    HDL Cholesterol 02/29/2024 35 (L)  40 - 60 mg/dL Final    VLDL Cholesterol Carlos 02/29/2024 10  5 - 40  mg/dL Final    LDL Chol Calc (NIH) 02/29/2024 162 (H)  0 - 100 mg/dL Final    WBC 02/29/2024 7.18  3.40 - 10.80 10*3/mm3 Final    RBC 02/29/2024 4.86  3.77 - 5.28 10*6/mm3 Final    Hemoglobin 02/29/2024 14.0  12.0 - 15.9 g/dL Final    Hematocrit 02/29/2024 42.0  34.0 - 46.6 % Final    MCV 02/29/2024 86.4  79.0 - 97.0 fL Final    MCH 02/29/2024 28.8  26.6 - 33.0 pg Final    MCHC 02/29/2024 33.3  31.5 - 35.7 g/dL Final    RDW 02/29/2024 13.5  12.3 - 15.4 % Final    Platelets 02/29/2024 354  140 - 450 10*3/mm3 Final    Neutrophil Rel % 02/29/2024 54.4  42.7 - 76.0 % Final    Lymphocyte Rel % 02/29/2024 30.8  19.6 - 45.3 % Final    Monocyte Rel % 02/29/2024 9.7  5.0 - 12.0 % Final    Eosinophil Rel % 02/29/2024 4.3  0.3 - 6.2 % Final    Basophil Rel % 02/29/2024 0.7  0.0 - 1.5 % Final    Neutrophils Absolute 02/29/2024 3.90  1.70 - 7.00 10*3/mm3 Final    Lymphocytes Absolute 02/29/2024 2.21  0.70 - 3.10 10*3/mm3 Final    Monocytes Absolute 02/29/2024 0.70  0.10 - 0.90 10*3/mm3 Final    Eosinophils Absolute 02/29/2024 0.31  0.00 - 0.40 10*3/mm3 Final    Basophils Absolute 02/29/2024 0.05  0.00 - 0.20 10*3/mm3 Final    Immature Granulocyte Rel % 02/29/2024 0.1  0.0 - 0.5 % Final    Immature Grans Absolute 02/29/2024 0.01  0.00 - 0.05 10*3/mm3 Final    nRBC 02/29/2024 0.0  0.0 - 0.2 /100 WBC Final    TSH 02/29/2024 1.870  0.270 - 4.200 uIU/mL Final    Glucose 02/29/2024 94  65 - 99 mg/dL Final    BUN 02/29/2024 8  6 - 20 mg/dL Final    Creatinine 02/29/2024 0.92  0.57 - 1.00 mg/dL Final    EGFR Result 02/29/2024 84.0  >60.0 mL/min/1.73 Final    Comment: GFR Normal >60  Chronic Kidney Disease <60  Kidney Failure <15      BUN/Creatinine Ratio 02/29/2024 8.7  7.0 - 25.0 Final    Sodium 02/29/2024 139  136 - 145 mmol/L Final    Potassium 02/29/2024 4.5  3.5 - 5.2 mmol/L Final    Chloride 02/29/2024 102  98 - 107 mmol/L Final    Total CO2 02/29/2024 25.2  22.0 - 29.0 mmol/L Final    Calcium 02/29/2024 9.1  8.6 - 10.5 mg/dL  Final    Total Protein 02/29/2024 7.1  6.0 - 8.5 g/dL Final    Albumin 02/29/2024 4.4  3.5 - 5.2 g/dL Final    Globulin 02/29/2024 2.7  gm/dL Final    A/G Ratio 02/29/2024 1.6  g/dL Final    Total Bilirubin 02/29/2024 0.4  0.0 - 1.2 mg/dL Final    Alkaline Phosphatase 02/29/2024 79  39 - 117 U/L Final    AST (SGOT) 02/29/2024 26  1 - 32 U/L Final    ALT (SGPT) 02/29/2024 51 (H)  1 - 33 U/L Final        Radiology Results        Assessment / Plan         Assessment and Plan   Diagnoses and all orders for this visit:    1. Fibromyalgia (Primary)  -     celecoxib (CeleBREX) 100 MG capsule; Take 1 capsule by mouth 2 (Two) Times a Day.  Dispense: 60 capsule; Refill: 2  -     predniSONE (DELTASONE) 10 MG tablet; Take 6 tablets by mouth Daily for 3 days, THEN 5 tablets Daily for 3 days, THEN 4 tablets Daily for 3 days, THEN 3 tablets Daily for 3 days, THEN 2 tablets Daily for 3 days, THEN 1 tablet Daily for 3 days, THEN 0.5 tablets Daily for 3 days.  Dispense: 65 tablet; Refill: 0  -     cyclobenzaprine (FLEXERIL) 10 MG tablet; Take 1 tablet by mouth 2 (Two) Times a Day As Needed for Muscle Spasms.  Dispense: 60 tablet; Refill: 1    2. Immunization due  -     Fluzone >6mos      Adjusting medications.  Will attempt a prednisone taper along with changing NSAID to Celebrex.  She has been unable to tolerate baclofen and tizanidine in the past.  Will try Flexeril for muscle spasm.  If not seeing significant benefit with these medications we will consider starting gabapentin at her follow-up.  We did not complete OMT today as she was very acute and tender to any palpation.      Discussed possible differential diagnoses, testing, treatment, recommended non-pharmacological interventions, risks, warning signs to monitor for that would indicate need for follow-up in clinic or ER. If no improvement with these regimens or you have new or worsening symptoms follow-up. Patient verbalizes understanding and agreement with plan of care.  Denies further needs or concerns.     Patient was given instructions and counseling regarding her condition and for health maintenance advised.                       Health Maintenance  Health Maintenance: There are no preventive care reminders to display for this patient.     Meds ordered during this visit  New Medications Ordered This Visit   Medications    celecoxib (CeleBREX) 100 MG capsule     Sig: Take 1 capsule by mouth 2 (Two) Times a Day.     Dispense:  60 capsule     Refill:  2    predniSONE (DELTASONE) 10 MG tablet     Sig: Take 6 tablets by mouth Daily for 3 days, THEN 5 tablets Daily for 3 days, THEN 4 tablets Daily for 3 days, THEN 3 tablets Daily for 3 days, THEN 2 tablets Daily for 3 days, THEN 1 tablet Daily for 3 days, THEN 0.5 tablets Daily for 3 days.     Dispense:  65 tablet     Refill:  0    cyclobenzaprine (FLEXERIL) 10 MG tablet     Sig: Take 1 tablet by mouth 2 (Two) Times a Day As Needed for Muscle Spasms.     Dispense:  60 tablet     Refill:  1       Meds stopped during this visit:  Medications Discontinued During This Encounter   Medication Reason    Magnesium Glycinate 120 MG capsule     melatonin 5 MG tablet tablet     meloxicam (Mobic) 15 MG tablet         Visit Diagnoses    ICD-10-CM ICD-9-CM   1. Fibromyalgia  M79.7 729.1   2. Immunization due  Z23 V05.9       Patient was given instructions and counseling regarding her condition or for health maintenance advice. Please see specific information pulled into the AVS if appropriate.     Follow Up   Return in about 4 weeks (around 10/28/2024) for followup, OMT Fibromyalgia .          This document has been electronically signed by Shanti Garrido DO   September 30, 2024 09:39 EDT    Dictated Utilizing Dragon Dictation: Part of this note may be an electronic transcription/translation of spoken language to printed text using the Dragon Dictation System.    Shanti Garrido D.O.  [facility]

## 2024-09-30 NOTE — PROGRESS NOTES
"Follow Up Visit    Patient: Nicole Jo  YOB: 1990  Date of Encounter: 06/04/2024  PCP: Shanti Garrido DO Subjective   Nicole Jo is a 34 y.o. female who presents to the office today for evaluation of No chief complaint on file.      No chief complaint on file.      Fibromyalgia    Depression  Her past medical history is significant for depression.       Ms. Jo is a pleasant 34 year old female who presents today to follow up on Fibromyalgia. She was diagnosed in August of 2023 - current medications include Diclofenac and Baclofen. She also uses CBD gummies and cannabis vape as needed. She note a lot of diffuse myalgias and low back pain. She reports feeling \"fine\" today but has a lot of \"bad days\".     Diclofenac and Mobic have not been beneficial as far as an antiinflammatory aspect.   I had also increased Cymbalta to 60 mg daily. I had prescribed baclofen previously but this caused auditory hallucinations and once stopped, these resolved. Tizanidine caused her to feel too sleepy.  Therefore, I suggested magnesium. Insurance would not approve this due to her insurance not covering otc meds.  Cymbalta hasn't helped with the pain etc but has improved the depression aspect.     She presents today for OMT evaluation and follow up. She was treated about a month ago. She thinks her range of motion has been slightly improved after for a few days.          Patient Active Problem List   Diagnosis    Depression    Insomnia    PCOS (polycystic ovarian syndrome)    Migraine    Seasonal affective disorder    Environmental and seasonal allergies    Class 3 severe obesity due to excess calories without serious comorbidity with body mass index (BMI) of 45.0 to 49.9 in adult    Chronic pain of multiple joints    Myalgia    NSAID long-term use    Elevated TSH    Well adult exam    Fibromyalgia       Visit Vitals  /68   Pulse 86   Temp 98.2 °F (36.8 °C) (Temporal)   Ht 157.5 cm (62.01\")   Wt 119 kg " (262 lb 3.2 oz)   SpO2 98%   BMI 47.94 kg/m²     34 y.o.female    Physical Exam  Vitals reviewed.   Constitutional:       Appearance: Normal appearance.   Cardiovascular:      Rate and Rhythm: Normal rate.   Pulmonary:      Effort: Pulmonary effort is normal.   Neurological:      General: No focal deficit present.      Mental Status: She is alert. Mental status is at baseline.          Radiology Results:    No radiology results for the last 30 days.    OMT Procedure  Indications: TART findings, muscle spasm, pain    Risks and benefits discussed and patient gave verbal consent to treat    Regions treated: C-Spine, Ribs, Upper Extremity, T- Spine, L-Spine, and Sacrum    Findings: Cervical Spine Generalized cervical motion restriction, Suboccipital restriction, or C7 ERS L, Upper Extremity Left shoulder restriction, Ribs 6-10 Left inhaled, Thoracic Spine T7 ERS L or T12 FRS R, Lumbar Spine L5 ERS L, or Sacrum R/L Rotation    Modalities: Muscle Energy, Direct Myofascial Release, and Indirect Myofascial Release    Patient reports decreased pain, improved range of motion, and improved functionality after treatment. There were no adverse effects.      Assessment & Plan   Diagnoses and all orders for this visit:    1. Fibromyalgia (Primary)  -     celecoxib (CeleBREX) 100 MG capsule; Take 1 capsule by mouth 2 (Two) Times a Day.  Dispense: 60 capsule; Refill: 2  -     predniSONE (DELTASONE) 10 MG tablet; Take 6 tablets by mouth Daily for 3 days, THEN 5 tablets Daily for 3 days, THEN 4 tablets Daily for 3 days, THEN 3 tablets Daily for 3 days, THEN 2 tablets Daily for 3 days, THEN 1 tablet Daily for 3 days, THEN 0.5 tablets Daily for 3 days.  Dispense: 65 tablet; Refill: 0  -     cyclobenzaprine (FLEXERIL) 10 MG tablet; Take 1 tablet by mouth 2 (Two) Times a Day As Needed for Muscle Spasms.  Dispense: 60 tablet; Refill: 1    2. Immunization due  -     Fluzone >6mos           Discussed the benefits and risk of OMT with  patient.  They are in agreement to attempt OMT for their treatment of their pain/discomfort.  Patient tolerated treatment well throughout the course.  Advised that there can be some mild soreness after treatment persisting for 48 to 72 hours.  Advised to drink plenty of water for this duration to combat this.  Any and all questions were answered and patient was comfortable with exam/treatment.             Transcribed from ambient dictation for Shanti Garrido DO by Shanti Garrido DO.      Dictated Utilizing Dragon Dictation: Part of this note may be an electronic transcription/translation of spoken language to printed text using the Dragon Dictation System    Shanti Garrido D.O.  [facility] [place]

## 2024-12-16 ENCOUNTER — OFFICE VISIT (OUTPATIENT)
Dept: FAMILY MEDICINE CLINIC | Facility: CLINIC | Age: 34
End: 2024-12-16
Payer: MEDICAID

## 2024-12-16 ENCOUNTER — LAB (OUTPATIENT)
Dept: LAB | Facility: HOSPITAL | Age: 34
End: 2024-12-16
Payer: MEDICAID

## 2024-12-16 VITALS
SYSTOLIC BLOOD PRESSURE: 128 MMHG | BODY MASS INDEX: 49.69 KG/M2 | TEMPERATURE: 97.8 F | HEIGHT: 62 IN | WEIGHT: 270 LBS | HEART RATE: 86 BPM | DIASTOLIC BLOOD PRESSURE: 90 MMHG

## 2024-12-16 DIAGNOSIS — G43.709 CHRONIC MIGRAINE WITHOUT AURA WITHOUT STATUS MIGRAINOSUS, NOT INTRACTABLE: ICD-10-CM

## 2024-12-16 DIAGNOSIS — M79.7 FIBROMYALGIA: ICD-10-CM

## 2024-12-16 DIAGNOSIS — M79.7 FIBROMYALGIA: Primary | ICD-10-CM

## 2024-12-16 LAB
ALBUMIN SERPL-MCNC: 4.1 G/DL (ref 3.5–5.2)
ALBUMIN/GLOB SERPL: 1.3 G/DL
ALP SERPL-CCNC: 73 U/L (ref 39–117)
ALT SERPL W P-5'-P-CCNC: 23 U/L (ref 1–33)
ANION GAP SERPL CALCULATED.3IONS-SCNC: 10.8 MMOL/L (ref 5–15)
AST SERPL-CCNC: 24 U/L (ref 1–32)
BILIRUB SERPL-MCNC: 0.3 MG/DL (ref 0–1.2)
BUN SERPL-MCNC: 12 MG/DL (ref 6–20)
BUN/CREAT SERPL: 12.5 (ref 7–25)
CALCIUM SPEC-SCNC: 8.9 MG/DL (ref 8.6–10.5)
CHLORIDE SERPL-SCNC: 102 MMOL/L (ref 98–107)
CHROMATIN AB SERPL-ACNC: <10 IU/ML (ref 0–14)
CO2 SERPL-SCNC: 24.2 MMOL/L (ref 22–29)
CREAT SERPL-MCNC: 0.96 MG/DL (ref 0.57–1)
CRP SERPL-MCNC: 0.48 MG/DL (ref 0–0.5)
EGFRCR SERPLBLD CKD-EPI 2021: 79.3 ML/MIN/1.73
ERYTHROCYTE [SEDIMENTATION RATE] IN BLOOD: 21 MM/HR (ref 0–20)
GLOBULIN UR ELPH-MCNC: 3.2 GM/DL
GLUCOSE SERPL-MCNC: 70 MG/DL (ref 65–99)
POTASSIUM SERPL-SCNC: 4.1 MMOL/L (ref 3.5–5.2)
PROT SERPL-MCNC: 7.3 G/DL (ref 6–8.5)
SODIUM SERPL-SCNC: 137 MMOL/L (ref 136–145)

## 2024-12-16 PROCEDURE — 86140 C-REACTIVE PROTEIN: CPT

## 2024-12-16 PROCEDURE — 86160 COMPLEMENT ANTIGEN: CPT

## 2024-12-16 PROCEDURE — 86038 ANTINUCLEAR ANTIBODIES: CPT

## 2024-12-16 PROCEDURE — 86200 CCP ANTIBODY: CPT

## 2024-12-16 PROCEDURE — 86431 RHEUMATOID FACTOR QUANT: CPT | Performed by: FAMILY MEDICINE

## 2024-12-16 PROCEDURE — 86235 NUCLEAR ANTIGEN ANTIBODY: CPT

## 2024-12-16 PROCEDURE — 86225 DNA ANTIBODY NATIVE: CPT

## 2024-12-16 PROCEDURE — 80053 COMPREHEN METABOLIC PANEL: CPT

## 2024-12-16 PROCEDURE — 99214 OFFICE O/P EST MOD 30 MIN: CPT | Performed by: FAMILY MEDICINE

## 2024-12-16 PROCEDURE — 1160F RVW MEDS BY RX/DR IN RCRD: CPT | Performed by: FAMILY MEDICINE

## 2024-12-16 PROCEDURE — 85652 RBC SED RATE AUTOMATED: CPT

## 2024-12-16 PROCEDURE — 1159F MED LIST DOCD IN RCRD: CPT | Performed by: FAMILY MEDICINE

## 2024-12-16 PROCEDURE — 96372 THER/PROPH/DIAG INJ SC/IM: CPT | Performed by: FAMILY MEDICINE

## 2024-12-16 PROCEDURE — 36415 COLL VENOUS BLD VENIPUNCTURE: CPT

## 2024-12-16 PROCEDURE — 1125F AMNT PAIN NOTED PAIN PRSNT: CPT | Performed by: FAMILY MEDICINE

## 2024-12-16 RX ORDER — SUMATRIPTAN SUCCINATE 100 MG/1
TABLET ORAL
Qty: 9 TABLET | Refills: 3 | Status: SHIPPED | OUTPATIENT
Start: 2024-12-16

## 2024-12-16 RX ORDER — KETOROLAC TROMETHAMINE 30 MG/ML
30 INJECTION, SOLUTION INTRAMUSCULAR; INTRAVENOUS ONCE
Status: COMPLETED | OUTPATIENT
Start: 2024-12-16 | End: 2024-12-16

## 2024-12-16 RX ORDER — CYCLOBENZAPRINE HCL 10 MG
10 TABLET ORAL 2 TIMES DAILY PRN
Qty: 60 TABLET | Refills: 1 | Status: SHIPPED | OUTPATIENT
Start: 2024-12-16

## 2024-12-16 RX ORDER — TRIAMCINOLONE ACETONIDE 40 MG/ML
40 INJECTION, SUSPENSION INTRA-ARTICULAR; INTRAMUSCULAR ONCE
Status: COMPLETED | OUTPATIENT
Start: 2024-12-16 | End: 2024-12-16

## 2024-12-16 RX ORDER — CELECOXIB 100 MG/1
100 CAPSULE ORAL 2 TIMES DAILY
Qty: 60 CAPSULE | Refills: 2 | Status: SHIPPED | OUTPATIENT
Start: 2024-12-16

## 2024-12-16 RX ADMIN — TRIAMCINOLONE ACETONIDE 40 MG: 40 INJECTION, SUSPENSION INTRA-ARTICULAR; INTRAMUSCULAR at 12:19

## 2024-12-16 RX ADMIN — KETOROLAC TROMETHAMINE 30 MG: 30 INJECTION, SOLUTION INTRAMUSCULAR; INTRAVENOUS at 12:19

## 2024-12-16 RX ADMIN — KETOROLAC TROMETHAMINE 30 MG: 30 INJECTION, SOLUTION INTRAMUSCULAR; INTRAVENOUS at 12:18

## 2024-12-16 NOTE — ASSESSMENT & PLAN NOTE
Pain has been refractory to multiple medications including NSAIDs, muscle relaxants, Cymbalta.   She hasn't followed with Rheumatology in the past for their recommendations.   Due to acuity of pain, will rx Toradol and Kenalog in office today   For now, continue Celebrex and Flexeril, Cymbalta.   She is taking THC as well.   Last JEFF was in August 2023 and was negative   Due to continued symptoms and no improvement I have restarted an autoimmune workup

## 2024-12-16 NOTE — PROGRESS NOTES
"      Subjective     Chief Complaint  Anxiety    Subjective          Nicole Jo is a 34 y.o. female who presents today to [facility] for follow up.    HPI:   Anxiety        Ms. Jo is a pleasant 34 year old female who presents today to follow up on Fibromyalgia. She was diagnosed in August of 2023 - current medications include Cymbalta, Celebrex and Flexeril. She also uses CBD gummies and cannabis vape as needed. She note a lot of diffuse myalgias and low back pain. She reports feeling \"fine\" today but has a lot of \"bad days\".   She previously had reported that Cymbalta hadn't helped with the pain etc but has improved the depression aspect. At previous visits, I had attempted OMT but she did not get any real benefit with it.     She notes that she was seeing minimal benefit with these medications. However, with the weather changes, she has had significant worsened symptoms over the last three weeks.   She had a steroid taper three months ago and this did seem to help, although she didn't follow the instructions.     She has never been evaluated by rheumatology for her symptoms.     She is in need of a refill on Imitrex, that she is in need of for migraines.     The following portions of the patient's history were reviewed and updated as appropriate: allergies, current medications, past family history, past medical history, past social history, past surgical history and problem list.    Objective     Objective     Allergy:   Allergies   Allergen Reactions    Baclofen Hallucinations    Cephalexin     Diclofenac Hallucinations        Current Medications:   Current Outpatient Medications   Medication Sig Dispense Refill    celecoxib (CeleBREX) 100 MG capsule Take 1 capsule by mouth 2 (Two) Times a Day. 60 capsule 2    cyclobenzaprine (FLEXERIL) 10 MG tablet Take 1 tablet by mouth 2 (Two) Times a Day As Needed for Muscle Spasms. 60 tablet 1    DULoxetine (Cymbalta) 60 MG capsule " Take 1 capsule by mouth Daily. 30 capsule 5    Etonogestrel (NEXPLANON SC) Inject  under the skin into the appropriate area as directed. Implanted August 30 of 2023      propranolol (INDERAL) 10 MG tablet Take 1 tablet by mouth 2 (Two) Times a Day.      spironolactone (ALDACTONE) 50 MG tablet Take 1 tablet by mouth Daily.      SUMAtriptan (Imitrex) 100 MG tablet Take one tablet at onset of headache. May repeat dose one time in 2 hours if headache not relieved. 9 tablet 3     Current Facility-Administered Medications   Medication Dose Route Frequency Provider Last Rate Last Admin    ketorolac (TORADOL) injection 30 mg  30 mg Intramuscular Once Radhika, Shanti, DO        ketorolac (TORADOL) injection 30 mg  30 mg Intramuscular Once Radhika, Shanti, DO        triamcinolone acetonide (KENALOG-40) injection 40 mg  40 mg Intramuscular Once Radhika, Shanti, DO           Past Medical History:  Past Medical History:   Diagnosis Date    Acute pharyngitis     ADHD (attention deficit hyperactivity disorder)     elementary school dx    Anxiety     Depression     Eustachian tube dysfunction     Fibromyalgia, primary     Hypertension     Insomnia     Joint pain, knee     Migraine     Premenstrual dysphoric disorder     Upper respiratory infection     Weight gain        Past Surgical History:  Past Surgical History:   Procedure Laterality Date    SALPINGECTOMY Bilateral     TONSILLECTOMY      WISDOM TOOTH EXTRACTION         Social History:  Social History     Socioeconomic History    Marital status:    Tobacco Use    Smoking status: Never     Passive exposure: Never    Smokeless tobacco: Never   Vaping Use    Vaping status: Every Day    Substances: THC (3 gram)    Devices: Disposable    Passive vaping exposure: Yes   Substance and Sexual Activity    Alcohol use: Not Currently     Comment: once or twice a month, if that    Drug use: Yes     Frequency: 7.0 times per week     Types: Marijuana     Comment: smoke/125mg gummy everyday  "for pain    Sexual activity: Not Currently     Partners: Female, Male     Birth control/protection: Surgical, Implant     Comment: bilateral saplingectomy       Family History:  Family History   Problem Relation Age of Onset    Bipolar disorder Mother     Arthritis Mother     Anxiety disorder Mother     Depression Mother     Irritable bowel syndrome Mother     Endometriosis Mother     LOGAN disease Mother     Cancer Mother         pancreatic cancer    Hyperlipidemia Mother     Mental illness Mother         bipolar/manic depressive    Miscarriages / Stillbirths Mother     Other Mother         chronic pain, ibs, acid reflux, endometriosis    Depression Father     Hypertension Father     Hyperlipidemia Father     Arthritis Father     Asthma Father     Diabetes Father     Mental illness Father         depression    ADD / ADHD Brother     Bipolar disorder Brother     Hepatitis Brother         C    Drug abuse Brother     ADD / ADHD Brother     Drug abuse Brother     Transient ischemic attack Maternal Grandmother     Stroke Maternal Grandmother     Hypertension Maternal Grandmother     Dementia Maternal Grandmother     Osteoporosis Maternal Grandmother     Other Maternal Grandmother         stroke, dementia, hpertension, osteoporosis    Lymphoma Maternal Grandfather     Meniere's disease Maternal Grandfather     Other Maternal Grandfather         non-hodgkins lymphoma    Arthritis Paternal Grandmother     Stroke Paternal Grandmother     Hypertension Paternal Grandmother     Other Paternal Grandmother         blood clot unknown site    Pancreatic cancer Paternal Grandfather     Hypertension Paternal Grandfather     Hyperlipidemia Paternal Grandfather     Diverticulosis Paternal Grandfather     Other Paternal Grandfather         cancer,  of heart attack       Vital Signs:   /90   Pulse 86   Temp 97.8 °F (36.6 °C) (Infrared)   Ht 157.5 cm (62.01\")   Wt 122 kg (270 lb)   BMI 49.37 kg/m²      Physical " Exam:  Physical Exam  Vitals reviewed.   Constitutional:       Appearance: Normal appearance. She is obese.   Cardiovascular:      Rate and Rhythm: Normal rate.   Pulmonary:      Effort: Pulmonary effort is normal.   Musculoskeletal:      Comments: Very tender to palpation in multiple areas of the back    Neurological:      Mental Status: She is alert. Mental status is at baseline.               PHQ-9 Score  PHQ-9 Total Score:       Lab Review  No visits with results within 3 Month(s) from this visit.   Latest known visit with results is:   Lab on 02/29/2024   Component Date Value Ref Range Status    Hemoglobin A1C 02/29/2024 5.70 (H)  4.80 - 5.60 % Final    Comment: Hemoglobin A1C Ranges:  Increased Risk for Diabetes  5.7% to 6.4%  Diabetes                     >= 6.5%  Diabetic Goal                < 7.0%      Total Cholesterol 02/29/2024 207 (H)  0 - 200 mg/dL Final    Comment: Cholesterol Reference Ranges  (U.S. Department of Health and Human Services ATP III  Classifications)  Desirable          <200 mg/dL  Borderline High    200-239 mg/dL  High Risk          >240 mg/dL  Triglyceride Reference Ranges  (U.S. Department of Health and Human Services ATP III  Classifications)  Normal           <150 mg/dL  Borderline High  150-199 mg/dL  High             200-499 mg/dL  Very High        >500 mg/dL  HDL Reference Ranges  (U.S. Department of Health and Human Services ATP III  Classifications)  Low     <40 mg/dl (major risk factor for CHD)  High    >60 mg/dl ('negative' risk factor for CHD)  LDL Reference Ranges  (U.S. Department of Health and Human Services ATP III  Classifications)  Optimal          <100 mg/dL  Near Optimal     100-129 mg/dL  Borderline High  130-159 mg/dL  High             160-189 mg/dL  Very High        >189 mg/dL      Triglycerides 02/29/2024 58  0 - 150 mg/dL Final    HDL Cholesterol 02/29/2024 35 (L)  40 - 60 mg/dL Final    VLDL Cholesterol Carlos 02/29/2024 10  5 - 40 mg/dL Final    LDL Chol Calc  ([facility]) 02/29/2024 162 (H)  0 - 100 mg/dL Final    WBC 02/29/2024 7.18  3.40 - 10.80 10*3/mm3 Final    RBC 02/29/2024 4.86  3.77 - 5.28 10*6/mm3 Final    Hemoglobin 02/29/2024 14.0  12.0 - 15.9 g/dL Final    Hematocrit 02/29/2024 42.0  34.0 - 46.6 % Final    MCV 02/29/2024 86.4  79.0 - 97.0 fL Final    MCH 02/29/2024 28.8  26.6 - 33.0 pg Final    MCHC 02/29/2024 33.3  31.5 - 35.7 g/dL Final    RDW 02/29/2024 13.5  12.3 - 15.4 % Final    Platelets 02/29/2024 354  140 - 450 10*3/mm3 Final    Neutrophil Rel % 02/29/2024 54.4  42.7 - 76.0 % Final    Lymphocyte Rel % 02/29/2024 30.8  19.6 - 45.3 % Final    Monocyte Rel % 02/29/2024 9.7  5.0 - 12.0 % Final    Eosinophil Rel % 02/29/2024 4.3  0.3 - 6.2 % Final    Basophil Rel % 02/29/2024 0.7  0.0 - 1.5 % Final    Neutrophils Absolute 02/29/2024 3.90  1.70 - 7.00 10*3/mm3 Final    Lymphocytes Absolute 02/29/2024 2.21  0.70 - 3.10 10*3/mm3 Final    Monocytes Absolute 02/29/2024 0.70  0.10 - 0.90 10*3/mm3 Final    Eosinophils Absolute 02/29/2024 0.31  0.00 - 0.40 10*3/mm3 Final    Basophils Absolute 02/29/2024 0.05  0.00 - 0.20 10*3/mm3 Final    Immature Granulocyte Rel % 02/29/2024 0.1  0.0 - 0.5 % Final    Immature Grans Absolute 02/29/2024 0.01  0.00 - 0.05 10*3/mm3 Final    nRBC 02/29/2024 0.0  0.0 - 0.2 /100 WBC Final    TSH 02/29/2024 1.870  0.270 - 4.200 uIU/mL Final    Glucose 02/29/2024 94  65 - 99 mg/dL Final    BUN 02/29/2024 8  6 - 20 mg/dL Final    Creatinine 02/29/2024 0.92  0.57 - 1.00 mg/dL Final    EGFR Result 02/29/2024 84.0  >60.0 mL/min/1.73 Final    Comment: GFR Normal >60  Chronic Kidney Disease <60  Kidney Failure <15      BUN/Creatinine Ratio 02/29/2024 8.7  7.0 - 25.0 Final    Sodium 02/29/2024 139  136 - 145 mmol/L Final    Potassium 02/29/2024 4.5  3.5 - 5.2 mmol/L Final    Chloride 02/29/2024 102  98 - 107 mmol/L Final    Total CO2 02/29/2024 25.2  22.0 - 29.0 mmol/L Final    Calcium 02/29/2024 9.1  8.6 - 10.5 mg/dL Final    Total Protein 02/29/2024  7.1  6.0 - 8.5 g/dL Final    Albumin 02/29/2024 4.4  3.5 - 5.2 g/dL Final    Globulin 02/29/2024 2.7  gm/dL Final    A/G Ratio 02/29/2024 1.6  g/dL Final    Total Bilirubin 02/29/2024 0.4  0.0 - 1.2 mg/dL Final    Alkaline Phosphatase 02/29/2024 79  39 - 117 U/L Final    AST (SGOT) 02/29/2024 26  1 - 32 U/L Final    ALT (SGPT) 02/29/2024 51 (H)  1 - 33 U/L Final        Radiology Results        Assessment / Plan         Assessment and Plan   Diagnoses and all orders for this visit:    1. Fibromyalgia (Primary)  Assessment & Plan:  Pain has been refractory to multiple medications including NSAIDs, muscle relaxants, Cymbalta.   She hasn't followed with Rheumatology in the past for their recommendations.   Due to acuity of pain, will rx Toradol and Kenalog in office today   For now, continue Celebrex and Flexeril, Cymbalta.   She is taking THC as well.   Last JEFF was in August 2023 and was negative   Due to continued symptoms and no improvement I have restarted an autoimmune workup       Orders:  -     ketorolac (TORADOL) injection 30 mg  -     ketorolac (TORADOL) injection 30 mg  -     triamcinolone acetonide (KENALOG-40) injection 40 mg  -     Ambulatory Referral to Rheumatology  -     cyclobenzaprine (FLEXERIL) 10 MG tablet; Take 1 tablet by mouth 2 (Two) Times a Day As Needed for Muscle Spasms.  Dispense: 60 tablet; Refill: 1  -     celecoxib (CeleBREX) 100 MG capsule; Take 1 capsule by mouth 2 (Two) Times a Day.  Dispense: 60 capsule; Refill: 2  -     Comprehensive Metabolic Panel; Future  -     Rheumatoid Factor  -     Sedimentation rate, automated; Future  -     C-reactive protein; Future  -     Cyclic Citrul Peptide Antibody, IgG / IgA; Future  -     Lupus Diagnostic Profile; Future    2. Chronic migraine without aura without status migrainosus, not intractable  -     SUMAtriptan (Imitrex) 100 MG tablet; Take one tablet at onset of headache. May repeat dose one time in 2 hours if headache not relieved.   Dispense: 9 tablet; Refill: 3          Discussed possible differential diagnoses, testing, treatment, recommended non-pharmacological interventions, risks, warning signs to monitor for that would indicate need for follow-up in clinic or ER. If no improvement with these regimens or you have new or worsening symptoms follow-up. Patient verbalizes understanding and agreement with plan of care. Denies further needs or concerns.     Patient was given instructions and counseling regarding her condition and for health maintenance advised.      Health Maintenance  Health Maintenance: There are no preventive care reminders to display for this patient.     Meds ordered during this visit  New Medications Ordered This Visit   Medications    SUMAtriptan (Imitrex) 100 MG tablet     Sig: Take one tablet at onset of headache. May repeat dose one time in 2 hours if headache not relieved.     Dispense:  9 tablet     Refill:  3    ketorolac (TORADOL) injection 30 mg    ketorolac (TORADOL) injection 30 mg    triamcinolone acetonide (KENALOG-40) injection 40 mg    cyclobenzaprine (FLEXERIL) 10 MG tablet     Sig: Take 1 tablet by mouth 2 (Two) Times a Day As Needed for Muscle Spasms.     Dispense:  60 tablet     Refill:  1    celecoxib (CeleBREX) 100 MG capsule     Sig: Take 1 capsule by mouth 2 (Two) Times a Day.     Dispense:  60 capsule     Refill:  2       Meds stopped during this visit:  Medications Discontinued During This Encounter   Medication Reason    SUMAtriptan (Imitrex) 100 MG tablet Reorder    celecoxib (CeleBREX) 100 MG capsule Reorder    cyclobenzaprine (FLEXERIL) 10 MG tablet Reorder          Visit Diagnoses    ICD-10-CM ICD-9-CM   1. Fibromyalgia  M79.7 729.1   2. Chronic migraine without aura without status migrainosus, not intractable  G43.709 346.70       Patient was given instructions and counseling regarding her condition or for health maintenance advice. Please see specific information pulled into the AVS if  appropriate.     Follow Up   Return in about 3 months (around 3/16/2025) for followup fibromyalgia .          This document has been electronically signed by Shanti Garrido DO   December 16, 2024 12:05 EST    Dictated Utilizing Dragon Dictation: Part of this note may be an electronic transcription/translation of spoken language to printed text using the Dragon Dictation System.    Shanti Garrido D.O.  [facility] [place]

## 2024-12-18 LAB — CCP IGA+IGG SERPL IA-ACNC: 4 UNITS (ref 0–19)

## 2024-12-26 LAB
ANA SER QL IF: NEGATIVE
C3 SERPL-MCNC: 189 MG/DL (ref 82–167)
C4 SERPL-MCNC: 23 MG/DL (ref 14–44)
CHROMATIN IGG SERPL-ACNC: <20 UNITS
DSDNA AB SER FARR-ACNC: <8 IU/ML
ENA SM AB SER-ACNC: <20 UNITS
ENA SS-A AB SER IA-ACNC: <20 UNITS
ENA SS-B AB SER IA-ACNC: <20 UNITS
U1 SNRNP AB SER QL: <20 UNITS

## 2025-03-17 ENCOUNTER — OFFICE VISIT (OUTPATIENT)
Dept: FAMILY MEDICINE CLINIC | Facility: CLINIC | Age: 35
End: 2025-03-17
Payer: MEDICAID

## 2025-03-17 VITALS
TEMPERATURE: 97.6 F | SYSTOLIC BLOOD PRESSURE: 122 MMHG | HEART RATE: 98 BPM | OXYGEN SATURATION: 98 % | BODY MASS INDEX: 47.92 KG/M2 | WEIGHT: 260.4 LBS | DIASTOLIC BLOOD PRESSURE: 80 MMHG | HEIGHT: 62 IN

## 2025-03-17 DIAGNOSIS — G43.709 CHRONIC MIGRAINE WITHOUT AURA WITHOUT STATUS MIGRAINOSUS, NOT INTRACTABLE: ICD-10-CM

## 2025-03-17 DIAGNOSIS — M25.50 CHRONIC PAIN OF MULTIPLE JOINTS: ICD-10-CM

## 2025-03-17 DIAGNOSIS — Z79.1 NSAID LONG-TERM USE: ICD-10-CM

## 2025-03-17 DIAGNOSIS — E66.01 CLASS 3 SEVERE OBESITY DUE TO EXCESS CALORIES WITHOUT SERIOUS COMORBIDITY WITH BODY MASS INDEX (BMI) OF 45.0 TO 49.9 IN ADULT: ICD-10-CM

## 2025-03-17 DIAGNOSIS — M79.7 FIBROMYALGIA: ICD-10-CM

## 2025-03-17 DIAGNOSIS — E28.2 PCOS (POLYCYSTIC OVARIAN SYNDROME): Primary | ICD-10-CM

## 2025-03-17 DIAGNOSIS — G89.29 CHRONIC PAIN OF MULTIPLE JOINTS: ICD-10-CM

## 2025-03-17 DIAGNOSIS — E66.813 CLASS 3 SEVERE OBESITY DUE TO EXCESS CALORIES WITHOUT SERIOUS COMORBIDITY WITH BODY MASS INDEX (BMI) OF 45.0 TO 49.9 IN ADULT: ICD-10-CM

## 2025-03-17 PROCEDURE — 1125F AMNT PAIN NOTED PAIN PRSNT: CPT | Performed by: FAMILY MEDICINE

## 2025-03-17 PROCEDURE — 1159F MED LIST DOCD IN RCRD: CPT | Performed by: FAMILY MEDICINE

## 2025-03-17 PROCEDURE — 1160F RVW MEDS BY RX/DR IN RCRD: CPT | Performed by: FAMILY MEDICINE

## 2025-03-17 PROCEDURE — 99214 OFFICE O/P EST MOD 30 MIN: CPT | Performed by: FAMILY MEDICINE

## 2025-03-17 RX ORDER — PROPRANOLOL HYDROCHLORIDE 10 MG/1
10 TABLET ORAL 2 TIMES DAILY
Qty: 180 TABLET | Refills: 3 | Status: SHIPPED | OUTPATIENT
Start: 2025-03-17

## 2025-03-17 RX ORDER — SPIRONOLACTONE 50 MG/1
50 TABLET, FILM COATED ORAL DAILY
Qty: 90 TABLET | Refills: 3 | Status: SHIPPED | OUTPATIENT
Start: 2025-03-17

## 2025-03-17 RX ORDER — CYCLOBENZAPRINE HCL 10 MG
10 TABLET ORAL 2 TIMES DAILY PRN
Qty: 180 TABLET | Refills: 3 | Status: SHIPPED | OUTPATIENT
Start: 2025-03-17

## 2025-03-17 RX ORDER — SUMATRIPTAN SUCCINATE 100 MG/1
TABLET ORAL
Qty: 9 TABLET | Refills: 3 | Status: SHIPPED | OUTPATIENT
Start: 2025-03-17

## 2025-03-17 RX ORDER — VIT C/B6/B5/MAGNESIUM/HERB 173 50-5-6-5MG
500 CAPSULE ORAL 2 TIMES DAILY
Qty: 180 CAPSULE | Refills: 3 | Status: SHIPPED | OUTPATIENT
Start: 2025-03-17

## 2025-03-17 NOTE — PATIENT INSTRUCTIONS

## 2025-03-17 NOTE — PROGRESS NOTES
Nicole Jo is a 34 y.o. female who presents today for PCOS, Obesity, and Fibromyalgia      HPI     Patient has chronic joint pain thought to be 2/2 fibromyalgia. She has noticed worsening pain with weather changes. She does find the medications benificial. She has appointment with rheumatology upcoming. She has been out of propranolol and flexeril which have caused her pain to worsen. She takes sumatriptan as needed for migraines and it works well but it does make her sleepy. She has not been exercising. She has been working on eating healthier but has been eating more carbs this winter. She has no acute complaints today.     Review of Systems   Constitutional:  Negative for chills, diaphoresis, fatigue, fever and unexpected weight loss.   HENT:  Negative for congestion, ear pain, sore throat and swollen glands.    Eyes:  Negative for visual disturbance.   Respiratory:  Negative for shortness of breath and wheezing.    Cardiovascular:  Negative for chest pain and palpitations.   Gastrointestinal:  Negative for abdominal pain, blood in stool, constipation, diarrhea, nausea, vomiting and GERD.   Endocrine: Negative for polydipsia and polyuria.   Genitourinary:  Negative for difficulty urinating and dysuria.   Musculoskeletal:  Positive for arthralgias, back pain and myalgias. Negative for joint swelling and neck pain.   Skin:  Negative for rash and skin lesions.   Allergic/Immunologic: Negative for environmental allergies.   Neurological:  Positive for headache. Negative for seizures, syncope, weakness and numbness.   Hematological:  Does not bruise/bleed easily.   Psychiatric/Behavioral:  Negative for suicidal ideas.         The following portions of the patient's history were reviewed and updated as appropriate: allergies, current medications, past family history, past medical history, past social history, past surgical history and problem list.    Current Outpatient Medications on File Prior to Visit  "  Medication Sig Dispense Refill    celecoxib (CeleBREX) 100 MG capsule Take 1 capsule by mouth 2 (Two) Times a Day. 60 capsule 2    DULoxetine (Cymbalta) 60 MG capsule Take 1 capsule by mouth Daily. 30 capsule 5    Etonogestrel (NEXPLANON SC) Inject  under the skin into the appropriate area as directed. Implanted August 30 of 2023      [DISCONTINUED] cyclobenzaprine (FLEXERIL) 10 MG tablet Take 1 tablet by mouth 2 (Two) Times a Day As Needed for Muscle Spasms. 60 tablet 1    [DISCONTINUED] propranolol (INDERAL) 10 MG tablet Take 1 tablet by mouth 2 (Two) Times a Day.      [DISCONTINUED] spironolactone (ALDACTONE) 50 MG tablet Take 1 tablet by mouth Daily.      [DISCONTINUED] SUMAtriptan (Imitrex) 100 MG tablet Take one tablet at onset of headache. May repeat dose one time in 2 hours if headache not relieved. 9 tablet 3     No current facility-administered medications on file prior to visit.       Allergies   Allergen Reactions    Baclofen Hallucinations    Cephalexin     Diclofenac Hallucinations        Visit Vitals  /80 (BP Location: Left arm, Patient Position: Sitting, Cuff Size: Large Adult)   Pulse 98   Temp 97.6 °F (36.4 °C) (Infrared)   Ht 157.5 cm (62\")   Wt 118 kg (260 lb 6.4 oz)   SpO2 98%   BMI 47.63 kg/m²        Physical Exam  Constitutional:       General: She is not in acute distress.     Appearance: She is well-developed. She is not diaphoretic.   HENT:      Head: Atraumatic.   Cardiovascular:      Rate and Rhythm: Normal rate and regular rhythm.      Heart sounds: Normal heart sounds. No murmur heard.     No friction rub. No gallop.   Pulmonary:      Effort: Pulmonary effort is normal. No respiratory distress.      Breath sounds: Normal breath sounds. No stridor. No wheezing, rhonchi or rales.   Musculoskeletal:      Cervical back: Normal range of motion and neck supple.   Skin:     General: Skin is warm and dry.   Neurological:      Mental Status: She is alert and oriented to person, place, " and time.   Psychiatric:         Behavior: Behavior normal.          Results for orders placed or performed in visit on 12/16/24   Comprehensive Metabolic Panel    Collection Time: 12/16/24 12:21 PM    Specimen: Blood   Result Value Ref Range    Glucose 70 65 - 99 mg/dL    BUN 12 6 - 20 mg/dL    Creatinine 0.96 0.57 - 1.00 mg/dL    Sodium 137 136 - 145 mmol/L    Potassium 4.1 3.5 - 5.2 mmol/L    Chloride 102 98 - 107 mmol/L    CO2 24.2 22.0 - 29.0 mmol/L    Calcium 8.9 8.6 - 10.5 mg/dL    Total Protein 7.3 6.0 - 8.5 g/dL    Albumin 4.1 3.5 - 5.2 g/dL    ALT (SGPT) 23 1 - 33 U/L    AST (SGOT) 24 1 - 32 U/L    Alkaline Phosphatase 73 39 - 117 U/L    Total Bilirubin 0.3 0.0 - 1.2 mg/dL    Globulin 3.2 gm/dL    A/G Ratio 1.3 g/dL    BUN/Creatinine Ratio 12.5 7.0 - 25.0    Anion Gap 10.8 5.0 - 15.0 mmol/L    eGFR 79.3 >60.0 mL/min/1.73   Sedimentation rate, automated    Collection Time: 12/16/24 12:21 PM    Specimen: Blood   Result Value Ref Range    Sed Rate 21 (H) 0 - 20 mm/hr   C-reactive protein    Collection Time: 12/16/24 12:21 PM    Specimen: Blood   Result Value Ref Range    C-Reactive Protein 0.48 0.00 - 0.50 mg/dL   Cyclic Citrul Peptide Antibody, IgG / IgA    Collection Time: 12/16/24 12:21 PM    Specimen: Blood   Result Value Ref Range    CCP Antibodies IgG/IgA 4 0 - 19 units   Lupus Diagnostic Profile    Collection Time: 12/16/24 12:21 PM    Specimen: Blood   Result Value Ref Range    ANTI-NUCLEAR AB BY IFA (RDL) Negative Negative    ANTI-DSDNA AB BY TUSHAR <8.0 <8.0 IU/mL    ANTI-SM AB (RDL) <20 <20 Units    Anti-U1 RNP Ab  <20 <20 Units    Anti-Ro (SS-A) Ab <20 <20 Units    Anti-La (SS-B) Ab <20 <20 Units    Anti-Chromatin Ab, IgG <20 <20 Units    C3 Complement 189 (H) 82 - 167 mg/dL    C4 Complement 23 14 - 44 mg/dL        Problems Addressed this Visit          Endocrine and Metabolic    PCOS (polycystic ovarian syndrome) - Primary    Chronic and stable.  Patient will continue spironolactone.            Class 3 severe obesity due to excess calories without serious comorbidity with body mass index (BMI) of 45.0 to 49.9 in adult    Patient's (Body mass index is 47.63 kg/m².) indicates that they are morbidly/severely obese (BMI > 40 or > 35 with obesity - related health condition) with health conditions that include hypertension and osteoarthritis . Weight is unchanged. BMI  is above average; BMI management plan is completed. We discussed low calorie, low carb based diet program, portion control, and increasing exercise.             Health Encounters    NSAID long-term use    Relevant Medications    Turmeric 500 MG capsule       Musculoskeletal and Injuries    Chronic pain of multiple joints    Patient is chronic pain in multiple joints thought to be secondary to fibromyalgia.  She has upcoming appointment with rheumatology.  Patient will continue Celebrex 100 mg twice daily as well as Cymbalta 60 mg daily.  She can add turmeric 500 mg twice daily.         Relevant Medications    Turmeric 500 MG capsule    Fibromyalgia    Relevant Medications    Turmeric 500 MG capsule    cyclobenzaprine (FLEXERIL) 10 MG tablet       Neuro    Migraine    Headaches are stable.    Plan:  Continue same medication/s without change.     Discussed medication dosage, use, side effects, and goals of treatment in detail.    Discussed monitoring symptoms and use of quick-relief medications and maintenance medication.    General Treatment Goals:   symptom prevention  minimize work absence  minimizing limitation in activity  prevention of exacerbations  decrease use of ER/inpatient care  minimization of adverse effects of treatment    Followup in 6 months                     Relevant Medications    propranolol (INDERAL) 10 MG tablet    cyclobenzaprine (FLEXERIL) 10 MG tablet    SUMAtriptan (Imitrex) 100 MG tablet     Diagnoses         Codes Comments      PCOS (polycystic ovarian syndrome)    -  Primary ICD-10-CM: E28.2  ICD-9-CM: 256.4        Fibromyalgia     ICD-10-CM: M79.7  ICD-9-CM: 729.1       Class 3 severe obesity due to excess calories without serious comorbidity with body mass index (BMI) of 45.0 to 49.9 in adult     ICD-10-CM: E66.813, Z68.42, E66.01  ICD-9-CM: 278.01, V85.42       Chronic pain of multiple joints     ICD-10-CM: M25.50, G89.29  ICD-9-CM: 719.49, 338.29       NSAID long-term use     ICD-10-CM: Z79.1  ICD-9-CM: V58.64       Chronic migraine without aura without status migrainosus, not intractable     ICD-10-CM: G43.709  ICD-9-CM: 346.70             Return in about 3 months (around 6/17/2025) for Annual.    Filipe Hernandez MD   3/17/2025

## 2025-03-17 NOTE — ASSESSMENT & PLAN NOTE
Patient is chronic pain in multiple joints thought to be secondary to fibromyalgia.  She has upcoming appointment with rheumatology.  Patient will continue Celebrex 100 mg twice daily as well as Cymbalta 60 mg daily.  She can add turmeric 500 mg twice daily.

## 2025-03-17 NOTE — ASSESSMENT & PLAN NOTE
Patient's (Body mass index is 47.63 kg/m².) indicates that they are morbidly/severely obese (BMI > 40 or > 35 with obesity - related health condition) with health conditions that include hypertension and osteoarthritis . Weight is unchanged. BMI  is above average; BMI management plan is completed. We discussed low calorie, low carb based diet program, portion control, and increasing exercise.

## 2025-04-25 ENCOUNTER — LAB (OUTPATIENT)
Facility: HOSPITAL | Age: 35
End: 2025-04-25
Payer: MEDICAID

## 2025-04-25 ENCOUNTER — OFFICE VISIT (OUTPATIENT)
Age: 35
End: 2025-04-25
Payer: MEDICAID

## 2025-04-25 VITALS
HEART RATE: 76 BPM | DIASTOLIC BLOOD PRESSURE: 72 MMHG | HEIGHT: 62 IN | TEMPERATURE: 97.3 F | BODY MASS INDEX: 47.63 KG/M2 | SYSTOLIC BLOOD PRESSURE: 134 MMHG

## 2025-04-25 DIAGNOSIS — M79.7 FIBROMYALGIA: Primary | ICD-10-CM

## 2025-04-25 DIAGNOSIS — M25.50 ARTHRALGIA OF MULTIPLE SITES: ICD-10-CM

## 2025-04-25 DIAGNOSIS — G89.29 CHRONIC LEFT HIP PAIN: ICD-10-CM

## 2025-04-25 DIAGNOSIS — M25.552 CHRONIC LEFT HIP PAIN: ICD-10-CM

## 2025-04-25 DIAGNOSIS — E55.9 VITAMIN D DEFICIENCY: ICD-10-CM

## 2025-04-25 DIAGNOSIS — R53.83 OTHER FATIGUE: ICD-10-CM

## 2025-04-25 LAB
BASOPHILS # BLD AUTO: 0.04 10*3/MM3 (ref 0–0.2)
BASOPHILS NFR BLD AUTO: 0.5 % (ref 0–1.5)
DEPRECATED RDW RBC AUTO: 43.4 FL (ref 37–54)
EOSINOPHIL # BLD AUTO: 0.11 10*3/MM3 (ref 0–0.4)
EOSINOPHIL NFR BLD AUTO: 1.4 % (ref 0.3–6.2)
ERYTHROCYTE [DISTWIDTH] IN BLOOD BY AUTOMATED COUNT: 13.5 % (ref 12.3–15.4)
ERYTHROCYTE [SEDIMENTATION RATE] IN BLOOD: 29 MM/HR (ref 0–20)
HCT VFR BLD AUTO: 42.6 % (ref 34–46.6)
HGB BLD-MCNC: 13.6 G/DL (ref 12–15.9)
IMM GRANULOCYTES # BLD AUTO: 0.02 10*3/MM3 (ref 0–0.05)
IMM GRANULOCYTES NFR BLD AUTO: 0.3 % (ref 0–0.5)
LYMPHOCYTES # BLD AUTO: 2.02 10*3/MM3 (ref 0.7–3.1)
LYMPHOCYTES NFR BLD AUTO: 26.4 % (ref 19.6–45.3)
MCH RBC QN AUTO: 28.4 PG (ref 26.6–33)
MCHC RBC AUTO-ENTMCNC: 31.9 G/DL (ref 31.5–35.7)
MCV RBC AUTO: 88.9 FL (ref 79–97)
MONOCYTES # BLD AUTO: 0.68 10*3/MM3 (ref 0.1–0.9)
MONOCYTES NFR BLD AUTO: 8.9 % (ref 5–12)
NEUTROPHILS NFR BLD AUTO: 4.77 10*3/MM3 (ref 1.7–7)
NEUTROPHILS NFR BLD AUTO: 62.5 % (ref 42.7–76)
NRBC BLD AUTO-RTO: 0 /100 WBC (ref 0–0.2)
PLATELET # BLD AUTO: 416 10*3/MM3 (ref 140–450)
PMV BLD AUTO: 10.6 FL (ref 6–12)
RBC # BLD AUTO: 4.79 10*6/MM3 (ref 3.77–5.28)
WBC NRBC COR # BLD AUTO: 7.64 10*3/MM3 (ref 3.4–10.8)

## 2025-04-25 PROCEDURE — 85025 COMPLETE CBC W/AUTO DIFF WBC: CPT

## 2025-04-25 PROCEDURE — 86140 C-REACTIVE PROTEIN: CPT

## 2025-04-25 PROCEDURE — 85652 RBC SED RATE AUTOMATED: CPT

## 2025-04-25 PROCEDURE — 84443 ASSAY THYROID STIM HORMONE: CPT

## 2025-04-25 PROCEDURE — 80074 ACUTE HEPATITIS PANEL: CPT

## 2025-04-25 PROCEDURE — 80053 COMPREHEN METABOLIC PANEL: CPT

## 2025-04-25 PROCEDURE — 82533 TOTAL CORTISOL: CPT

## 2025-04-25 PROCEDURE — 36415 COLL VENOUS BLD VENIPUNCTURE: CPT

## 2025-04-25 PROCEDURE — 82607 VITAMIN B-12: CPT

## 2025-04-25 PROCEDURE — 82306 VITAMIN D 25 HYDROXY: CPT

## 2025-04-25 RX ORDER — CELECOXIB 100 MG/1
200 CAPSULE ORAL 2 TIMES DAILY
Qty: 60 CAPSULE | Refills: 5 | Status: SHIPPED | OUTPATIENT
Start: 2025-04-25 | End: 2025-04-25 | Stop reason: SDUPTHER

## 2025-04-25 RX ORDER — CELECOXIB 200 MG/1
200 CAPSULE ORAL 2 TIMES DAILY
Qty: 60 CAPSULE | Refills: 5 | Status: SHIPPED | OUTPATIENT
Start: 2025-04-25

## 2025-04-25 NOTE — PATIENT INSTRUCTIONS
Fibromyalgia Patient Education Websites.     CDC exercise website: http://www.cdc.gov/arthritis/basics/physical-activity-overview.html has general information and guidelines for arthritis and fibromyalgia patients starting exercise program    [facility] Fibromyalgia Patient education website: [email or web address] designed by the [place]’s Fibromyalgia research group led by Dr Rodolfo Coughlin MD. Has 9 modules that reviews our current understanding of what fibromyalgia is as well as management options emphasizing self management    You Tube video: https://www.[email or web address].com/watch?[email or web address] animated video presenting concept of chronic centralized pain syndromes such as fibromyalgia. This is done in way that would be understandable to patients.     American College of [employer]     Fibromyalgia:     Fibromyalgia is a common health problem that causes widespread pain and tenderness (sensitivity to touch). The pain and tenderness tend to come and go, and move about the body. Most often, people with this chronic (long-term) illness are fatigued (very tired) and have sleep problems. It can be hard to diagnose Fibromyalgia.     Fast Facts:   · Fibromyalgia affects two to four percent of people, mostly women.   · Doctors diagnose fibromyalgia based on all the patient’s relevant symptoms (what you feel), no longer just on the number of tender points.   · There is no test to detect this disease, but you may need lab tests or X-rays to ruleout other health problems.   · Though there is no cure, but medications can relieve symptoms.  · Patients also may feel better with proper self-care, such as exercise and getting enough sleep.     What is Fibromyalgia?   Fibromyalgia is a chronic health problem that causes pain all over the body and other symptoms. Other symptoms of fibromyalgia that patients most often have are:   · Tenderness to touch or pressure affecting muscles and sometimes  joints or even the skin   · Severe fatigue   · Sleep problems (waking up unrefreshed)   · Problems with memory or thinking clearly     Some patients also may have:   · Depression or anxiety   · Migraine or tension headaches   · Digestive problems: irritable bowel syndrome (commonly called IBS) or gastroesophageal reflux disease (often referred to as GERD)   · Irritable or overactive bladder   · Pelvic pain   · Temporomandibular disorder--often called TMJ (a set of symptoms including face or jaw pain, jaw clicking and ringing in the ears)   Symptoms of fibromyalgia and its related problems can vary in intensity, and will wax and wane over time. Stress often worsens the symptoms.     What causes Fibromyalgia?   The causes of fibromyalgia are unclear. They may be different in different people. Fibromyalgia may run in families. There likely are certain genes that can make people more prone to getting fibromyalgia and the other health problems that can occur with it. Genes alone, though, do not cause fibromyalgia.   There is most often some triggering factor that sets off fibromyalgia. It may be spine problems, arthritis, injury, or other type of physical stress. Emotional stress also may trigger this illness. The result is a change in the way the body “talks” with the spinal cord and brain. Levels of brain chemicals and proteins may change. For the person with fibromyalgia, it is as though the “volume control” is turned up too high in the brain's pain processing centers.     Who gets Fibromyalgia?  Fibromyalgia is most common in women, though it can occur in men. It most often starts in middle adulthood, but can occur in the teen years and in old age. Younger children can also develop widespread body pain and fatigue.   You are at higher risk for fibromyalgia if you have a rheumatic disease (health problem that affects the joints, muscles, and bones) These include osteoarthritis, lupus, rheumatoid arthritis or  ankylosing spondylitis.     How is fibromyalgia diagnosed?   A doctor will suspect fibromyalgia based on your symptoms. Doctors may require that you have tenderness to pressure or tender points at a specific number of certain spots before saying you have fibromyalgia, but they are not required to make the diagnosis. A physical exam can be helpful to detect tenderness and to exclude other causes of muscle pain.   There are no diagnostic tests (such as X-rays or blood tests) for this problem. Yet, you may need tests to rule out another health problem that can be confused with fibromyalgia.   Because widespread body pain is the main feature of fibromyalgia, health care providers will ask you to describe your pain. This may help tell the difference between fibromyalgia and other diseases with similar symptoms. Other conditions such as hypothyroidism (underactive thyroid gland) and polymyalgia rheumatica sometimes mimic fibromyalgia. Yet, certain blood tests can tell if you have either of these problems. Sometimes, fibromyalgia is confused with rheumatoid arthritis or lupus. But, again, there is a difference in the symptoms, physical findings and blood tests that will help your health care provider detect these health problems. Unlike fibromyalgia, these rheumatic diseases cause inflammation in the joints and tissues.   Criteria Needed for a Fibromyalgia Diagnosis   1. Generalized pain and increased sensitivity (patients can get pain with somebody hugging them or even with their blood pressure being taken at a doctor's visit). Some patients also even mention that certain textures of fabric hurt their body.  · Fatigue   · Waking unrefreshed  · Cognitive (memory or thought) problems   Plus number of other general physical symptoms  2. Symptoms lasting at least three months at a similar level   3. No other health problem that would explain the pain and other symptoms.     How is Fibromyalgia treated?   There is no cure for  fibromyalgia. However, symptoms can be treated with both medication and non-drug treatments. Many times the best outcomes are achieved by using multiple types of treatments.   Medications: The [place] Food and Drug Administration has approved three drugs for the treatment of fibromyalgia. They include two drugs that change some of the brain chemicals (serotonin and norepinephrine) that help control pain levels: duloxetine (Cymbalta) and milnacipran (Savella). Older drugs that affect these same brain chemicals also may be used to treat fibromyalgia. These include amitriptyline (Elavil) and cyclobenzaprine (Flexeril). Other antidepressant drugs can be helpful in some patients. Side effects vary by the drug. Ask your doctor about the risks and benefits of your medicine.   The other drug approved for fibromyalgia is pregabalin (Lyrica). Pregabalin and another drug, gabapentin (Neurontin), work by blocking the over activity of nerve cells involved in pain transmission. These medicines may cause dizziness, sleepiness, swelling and weight gain.   Doctors do not suggest using opioids for treating fibromyalgia. This is not because of fears of dependence. Rather, evidence suggests these drugs are not of great benefit to most people with fibromyalgia. In fact, they may cause greater pain sensitivity or make pain persist.   In some cases, fibromyalgia pain can improve with use of over-the-counter medicines such as acetaminophen (Tylenol) or nonsteroidal anti-inflammatory drugs (commonly called NSAIDs) like ibuprofen (Advil, Motrin) or naproxen (Aleve, Anaprox). Yet, these drugs likely treat the pain triggers rather than the fibromyalgia pain itself. Thus, they are most useful in people who have other causes for pain such as arthritis.  For sleep problems, some of the medicines that treat pain also improve sleep. These include cyclobenzaprine (Flexeril), amitriptyline (Elavil), gabapentin (Neurontin) or pregabalin (Lyrica). It is  not recommended that patients with fibromyalgia take sleeping medicines like zolpidem (Ambien) or benzodiazepine medications.   Other Therapies: People with fibromyalgia should use non-drug treatments as well as any medicines their doctors suggest.    Research shows that the most effective treatment for fibromyalgia is physical exercise.     Physical exercise should be used in addition to any drug treatment. Patients benefit most from aerobic exercises. Other body-based therapies including Buddy Chi and yoga can ease fibromyalgia symptoms.   Cognitive behavioral therapy is a type of therapy focused on understanding how thoughts and behaviors affect pain and other symptoms. CBT and related treatments such as mindfulness can help patients learn symptom reduction skills that lessen pain.   Other complementary and alternative therapies (sometimes called CAM or integrative medicine), such as acupuncture, chiropractic and massage therapy, can be useful to manage fibromyalgia symptoms. Many of these treatments, though, have not been well tested in patients with fibromyalgia.     Living with Fibromyalgia:   Even with the many treatment options, patient self-care is vital to improving symptoms and daily function. In concert with medical treatment, healthy lifestyle behaviors can reduce pain, increase sleep quality, lessen fatigue and help you cope better with fibromyalgia.   Here are some self-care tips for living with fibromyalgia:   · Make time to relax each day. Deep-breathing exercises and meditation will help reduce the stress that can bring on symptoms.   · Set a regular sleep pattern. Go to bed and wake up at the same time each day. Getting enough sleep lets your body repair itself, physically and mentally. Also, avoid daytime napping and limit caffeine intake, which can disrupt sleep. Nicotine is a stimulant, so those fibromyalgia patients with sleep problems should stop smoking.   · Exercise often. This is a very  important part of fibromyalgia treatment. While difficult at first, regular exercise often reduces pain symptoms and fatigue. Patients should follow the saying, “Start low, go slow.” Slowly add daily fitness into your routine. For instance, take the stairs instead of the elevator, or park further away from the store. As your symptoms decrease with drug treatments, start increasing your activity. Add in some walking, swimming, water aerobics and/or stretching exercises, and begin to do things that you stopped doing because of your pain and other symptoms. It takes time to create a comfortable routine. Just get moving, stay active and don't give up!   · Educate yourself. Nationally recognized organizations like the Arthritis Foundation and the National Fibromyalgia Association are great resources for information. Share this information with family, friends and co-workers.    Points to remember:   · Look forward, not backward. Focus on what you need to do to get better, not what caused your illness.   · As your symptoms decrease with drug treatments, start increasing your activity. Begin to fd things that you stopped doing because of your pain and other symptoms.   · With proper treatment and self-care, you can get better and live a normal life.    Complementary and Alternative Therapies for Fibromyalgia    Mind/Body Therapies  - We recommend yoga for many patients with fibromyalgia  o If you have any injuries or physical limitations, your yoga program should be adjusted. Postures can be modified, or you can even do a yoga walk or chair yoga. Please be sure to discuss this with your yoga therapist  - The Chinese practices of qigong and buster chi are also helpful in fibromyalgia   - There is not strong scientific evidence for acupuncture or massage in fibromyalgia. However, many patients with fibromyalgia find one or both of these practices helpful, so it is reasonable to try them.    Nutrition/Supplements  - Many  patients with fibromyalgia try alternative medications, nutritional supplements and/or elimination diets. Unfortunately, none of these approaches has been shown to be effective in fibromyalgia, and there may be risks  - Please speak with your doctor prior to starting any alternative medications, nutritional supplements, or elimination diets.

## 2025-04-25 NOTE — PROGRESS NOTES
Office Visit       Date: 04/25/2025   Patient Name: Nicole Jo  MRN: [id number]  YOB: 1990    Referring Physician: Shanti Garrido DO     Chief Complaint:   Chief Complaint   Patient presents with    Fibromyalgia       History of Present Illness: Nicole Jo is a 34 y.o. female with history of depression/anxiety, PCOS, obesity who is here today in consultation from her PCP for fibromyalgia    History of Present Illness  She was diagnosed with fibromyalgia in 2023 by Dr. Cortes, who subsequently transferred her care to Dr. Garrido for osteopathic manipulation therapy. This treatment has provided some relief, but recent attempts have been hindered by her tension. She reports no joint swelling but experiences widespread chronic muscle soreness. Her pain level remains at a 5 or 6 even with medication, including a gummy and a vape pen. She has not tried Lyrica due to insurance restrictions but found gabapentin helpful previously during a bout of shingles. She has not tried amitriptyline. She has not tried physical therapy and is currently seeking disability due to her inability to work full-time. She reports no history of liver disease or thyroid issues. She has not started turmeric due to financial constraints. She reports no rashes, oral or nasal sores, seizures, blood clots, or psoriasis. She is considering discussing the use of a cane with Dr. Garrido. She is on Cymbalta 60 mg once daily, cyclobenzaprine twice daily, and Celebrex 100 mg twice daily.    She also reports severe pain in her left hip, which she suspects may be due to arthritis. She describes the pain as deep, located in the ball of the femur, and feels like pressure on the bone. She has not consulted orthopedics or pain management specialists. She has been experiencing left hip pain for at least 5 years and received a Toradol injection that provided relief for about a week. She reports no known hip injury. She also  reports occasional shoulder discomfort upon waking, requiring her to crack it back into place before use. She frequently experiences joint popping and snapping sounds.    She has been experiencing intermittent suicidal ideation since she was 10 years old. She is on medications for it and sees a therapist every week.  She has no active suicidal plans    Supplemental Information  She is on spironolactone for facial hair due to PCOS. She had a chest x-ray in 2024 when she was seeing  psychiatric before they transferred out her care over to Dr. Garrido. She was on incredibly high doses of Lexapro and propranolol, which if not weaned off of those, they can affect her heartbeat. One morning she was just having chest pain and they could not figure it out, so she went to the ER and then she learned later that week that propranolol can affect her heart rate. She tries to stay on that as consistently as possible because otherwise she gets chest pains from her heart going. She has been on Lexapro the last 2 or 3 years.    FAMILY HISTORY  The patient believes her mother has fibromyalgia, although she has not been diagnosed.    MEDICATIONS  Current: Cymbalta, cyclobenzaprine, Celebrex, spironolactone, Lexapro, propranolol    Records reviewed  -Labs 12/16/2024: Normal CBC, normal CMP, sed rate 21, normal CRP, C4 normal, C3 elevated,   negative rheumatoid factor, negative CCP antibody, negative JEFF, negative FACUNDO panel  -Labs 2/29/2024: Normal CBC/CMP, hemoglobin A1c 5.7, normal TSH, cholesterol 207    Subjective     Review of Systems: Review of Systems   Constitutional:  Negative for chills, fatigue, fever and unexpected weight loss.   HENT:  Positive for sinus pressure and tinnitus. Negative for mouth sores and sore throat.    Eyes:  Positive for photophobia. Negative for pain and redness.   Respiratory:  Negative for cough and shortness of breath.    Cardiovascular:  Negative for chest pain.   Gastrointestinal:  Positive for  GERD. Negative for abdominal pain, blood in stool, diarrhea, nausea and vomiting.   Endocrine: Positive for polydipsia. Negative for polyuria.   Genitourinary:  Positive for menstrual problem, pelvic pain and pelvic pressure. Negative for dysuria, genital sores and hematuria.   Musculoskeletal:  Positive for arthralgias, back pain, gait problem, myalgias and neck pain. Negative for joint swelling and neck stiffness.   Skin:  Negative for rash and bruise.   Allergic/Immunologic: Positive for environmental allergies.   Neurological:  Positive for numbness, headache and memory problem. Negative for seizures and weakness.   Hematological:  Negative for adenopathy. Does not bruise/bleed easily.   Psychiatric/Behavioral:  Positive for decreased concentration, suicidal ideas, positive for hyperactivity and stress. Negative for depressed mood. The patient is nervous/anxious.         Past Medical History:   Past Medical History:   Diagnosis Date    Acute pharyngitis     ADHD (attention deficit hyperactivity disorder)     elementary school dx    Anxiety     Depression     Eustachian tube dysfunction     Fibromyalgia, primary     Hypertension     Insomnia     Joint pain, knee     Migraine     Premenstrual dysphoric disorder     Upper respiratory infection     Weight gain        Past Surgical History:   Past Surgical History:   Procedure Laterality Date    SALPINGECTOMY Bilateral     TONSILLECTOMY      WISDOM TOOTH EXTRACTION         Family History:   Family History   Problem Relation Age of Onset    Bipolar disorder Mother     Arthritis Mother     Anxiety disorder Mother     Depression Mother     Irritable bowel syndrome Mother     Endometriosis Mother     LOGAN disease Mother     Cancer Mother         pancreatic cancer    Hyperlipidemia Mother     Mental illness Mother         bipolar/manic depressive    Miscarriages / Stillbirths Mother     Other Mother         chronic pain, ibs, acid reflux, endometriosis    Depression Father      Hypertension Father     Hyperlipidemia Father     Arthritis Father     Asthma Father     Diabetes Father     Mental illness Father         depression    ADD / ADHD Brother     Bipolar disorder Brother     Hepatitis Brother         C    Drug abuse Brother     ADD / ADHD Brother     Drug abuse Brother     Transient ischemic attack Maternal Grandmother     Stroke Maternal Grandmother     Hypertension Maternal Grandmother     Dementia Maternal Grandmother     Osteoporosis Maternal Grandmother     Other Maternal Grandmother         stroke, dementia, hpertension, osteoporosis    Lymphoma Maternal Grandfather     Meniere's disease Maternal Grandfather     Other Maternal Grandfather         non-hodgkins lymphoma    Arthritis Paternal Grandmother     Stroke Paternal Grandmother     Hypertension Paternal Grandmother     Other Paternal Grandmother         blood clot unknown site    Pancreatic cancer Paternal Grandfather     Hypertension Paternal Grandfather     Hyperlipidemia Paternal Grandfather     Diverticulosis Paternal Grandfather     Other Paternal Grandfather         cancer,  of heart attack    Cancer Paternal Grandfather         pancreatic cancer    Developmental Disability Brother         add    Drug abuse Brother     Developmental Disability Brother         adhd    Drug abuse Brother     Learning disabilities Brother         adhd       Social History:   Social History     Socioeconomic History    Marital status:    Tobacco Use    Smoking status: Never     Passive exposure: Never    Smokeless tobacco: Never   Vaping Use    Vaping status: Every Day    Substances: THC (3 gram)    Devices: Disposable    Passive vaping exposure: Yes   Substance and Sexual Activity    Alcohol use: Not Currently     Comment: once or twice a month, if that    Drug use: Yes     Frequency: 7.0 times per week     Types: Marijuana     Comment: smoke/125mg gummy everyday for pain    Sexual activity: Not Currently     Partners:  "Female, Male     Birth control/protection: Surgical, Implant     Comment: bilateral saplingectomy       Medications:   Current Outpatient Medications:     celecoxib (CeleBREX) 200 MG capsule, Take 1 capsule by mouth 2 (Two) Times a Day., Disp: 60 capsule, Rfl: 5    cyclobenzaprine (FLEXERIL) 10 MG tablet, Take 1 tablet by mouth 2 (Two) Times a Day As Needed for Muscle Spasms., Disp: 180 tablet, Rfl: 3    DULoxetine (Cymbalta) 60 MG capsule, Take 1 capsule by mouth Daily., Disp: 30 capsule, Rfl: 5    Etonogestrel (NEXPLANON SC), Inject  under the skin into the appropriate area as directed. Implanted August 30 of 2023, Disp: , Rfl:     propranolol (INDERAL) 10 MG tablet, Take 1 tablet by mouth 2 (Two) Times a Day., Disp: 180 tablet, Rfl: 3    spironolactone (ALDACTONE) 50 MG tablet, Take 1 tablet by mouth Daily., Disp: 90 tablet, Rfl: 3    SUMAtriptan (Imitrex) 100 MG tablet, Take one tablet at onset of headache. May repeat dose one time in 2 hours if headache not relieved., Disp: 9 tablet, Rfl: 3    Turmeric 500 MG capsule, Take 1 capsule by mouth 2 (Two) Times a Day. (Patient not taking: Reported on 4/25/2025), Disp: 180 capsule, Rfl: 3    Allergies:   Allergies   Allergen Reactions    Baclofen Hallucinations    Diclofenac Hallucinations    Cephalexin Unknown - Low Severity         Objective      Vital Signs:   Vitals:    04/25/25 0858   BP: 134/72   BP Location: Right arm   Patient Position: Sitting   Pulse: 76   Temp: 97.3 °F (36.3 °C)   Height: 157.5 cm (62\")   PainSc: 7      Body mass index is 47.63 kg/m².       Physical Exam:  Physical Exam   MUSCULOSKELETAL:   No peripheral synovitis.  No dactylitis.  No pitting the nails.  Normal capillaroscopy  Widespread tender points are present above and below the waist. Diffuse allodynia present    Complete joint exam was performed including the MCPs, PIPs, DIPs of the hands, wrists, elbows, shoulders, hips, knees and ankles.  No soft tissue swelling or tenderness is " "present except as above.    General: The patient is well-developed and well nourished. Cooperative, alert and oriented. Affect is depressed. hydration appears normal.   HEENT: Normocephalic and atraumatic. Lids and conjunctiva are normal. Pupils are equal and sclera are clear. Oropharynx is clear   NECK neck is supple without adenopathy, masses or thyromegaly.   CARDIOVASCULAR: Regular rate and rhythm. No murmurs, rubs or gallops   LUNGS: Effort is normal. Lungs are clear bilateral   ABDOMEN: Not examined  EXTREMITIES: Peripheral pulses are intact. No clubbing.   SKIN: No rashes. No subcutaneous nodules. No digital ulcers. No sclerodactyly.   NEUROLOGIC: Gait is normal. Strength testing is normal.  No focal neurologic deficits    Results Review:   Labs:    Lab Results   Component Value Date    GLUCOSE 70 12/16/2024    BUN 12 12/16/2024    CREATININE 0.96 12/16/2024    EGFR 79.3 12/16/2024    BCR 12.5 12/16/2024    K 4.1 12/16/2024    CO2 24.2 12/16/2024    CALCIUM 8.9 12/16/2024    ALBUMIN 4.1 12/16/2024    BILITOT 0.3 12/16/2024    AST 24 12/16/2024    ALT 23 12/16/2024     Lab Results   Component Value Date    WBC 7.18 02/29/2024    HGB 14.0 02/29/2024    HCT 42.0 02/29/2024    MCV 86.4 02/29/2024     02/29/2024     Lab Results   Component Value Date    SEDRATE 21 (H) 12/16/2024     Lab Results   Component Value Date    CRP 0.48 12/16/2024     No results found for: \"QUANTIFERO\", \"QUANTITB1\", \"QUANTITB2\", \"QUANTIFERN\", \"QUANTIFERM\", \"QUANTITBGLDP\"  No results found for: \"RF\"  Lab Results   Component Value Date    HEPBSAG Negative 06/28/2022           Procedures    Assessment / Plan      1. Fibromyalgia    2. Chronic left hip pain    3. Arthralgia of multiple sites    4. Vitamin D deficiency    5. Other fatigue      -Labs 12/16/2024: Normal CBC, normal CMP, sed rate 21, normal CRP, C4 normal, C3 elevated,   negative rheumatoid factor, negative CCP antibody, negative JEFF, negative FACUNDO panel  -Labs 2/29/2024: " Normal CBC/CMP, hemoglobin A1c 5.7, normal TSH, cholesterol 207    34 y.o. female with history of depression/anxiety, PCOS, obesity who is here today in consultation from her PCP for fibromyalgia  Assessment & Plan  - Fibromyalgia  - Chronic pain  The diagnosis of fibromyalgia is confirmed  There is no clinical or lab evidence suggesting a coexistent inflammatory rheumatic disease such as rheumatoid arthritis, lupus, or psoriatic arthritis.   The possibility of autism spectrum disorder was also considered, given the patient's sensory sensitivities and social difficulties.   Reassurance given to the patient that I find no clinical or lab evidence of an inflammatory rheumatic disease, but that she does have evidence of fibromyalgia    Recommendations:  - Labs ordered as below for continued evaluation.    - Weight loss through Mediterranean diet encouraged  - The dosage of Celebrex will be increased to 200 mg twice daily.   - Continue duloxetine 60 mg daily  - The introduction of a low dose of pregabalin 25 mg twice daily with gradual taper upward if needed to goal dose of 150 mg twice daily, in conjunction with her present use of duloxetine, is suggested as a future option, pending approval from her PCP.  I will defer management of controlled medications such as pregabalin to her PCP  -A referral for physical therapy has been provided to address both the hip pain and fibromyalgia symptoms. Educational materials on fibromyalgia have been uploaded to [email or web address] for further review.    I encouraged regular low-impact exercise up to 30 minutes/day.  If this is unattainable, recommended graded exercise program starting with 5 minutes of dedicated cardiovascular exercise daily, increasing by 1 minute daily until goal of 30 minutes daily is reached.    -Recommend conservative measures to improve sleep  -Consider referral to sleep medicine for ALONSO screening-   -Counseled on alternative therapies that can help with chronic pain  including massage therapy, yoga, buster chi    -Chronic left hip pain.  An x-ray of the hip will be conducted today.  -Increase celecoxib as above  -Physical therapy  -Weight loss through a Mediterranean diet  -Consult orthopedics if chronic left hip pain persists despite above measures    -Depression, intermittent suicidal ideation  The patient has been experiencing suicidal ideation since age 10 and is currently on medications and seeing a therapist weekly.  She denies any active suicidal plan    -Polycystic ovary syndrome      -Obesity  Recommend Mediterranean diet and regular exercise  Consider GLP-1 inhibitor through PCP if unable to lose weight with diet and exercise    She is accompanied by her significant other throughout the visit today    Orders Placed This Encounter   Procedures    XR Hip With or Without Pelvis 2 - 3 View Left    XR Chest 2 View    CBC Auto Differential    C-reactive Protein    Comprehensive Metabolic Panel    Sedimentation Rate    Vitamin B12    Vitamin D,25-Hydroxy    Hepatitis Panel, Acute    TSH    Cortisol    Ambulatory Referral to Physical Therapy for Evaluation & Treatment    Ambulatory Referral to Orthopedic Surgery     New Medications Ordered This Visit   Medications    celecoxib (CeleBREX) 200 MG capsule     Sig: Take 1 capsule by mouth 2 (Two) Times a Day.     Dispense:  60 capsule     Refill:  5      TIME SPENT: I spent 45 minutes caring for the patient on this date of service.  This time includes time spent by me in the following activities: Preparing for the visit, obtaining records, reviewing/ordering tests and independently reviewing results, performing a medically appropriate history/exam, counseling and educating the patient/family/caregiver, ordering medications, tests, or procedures, and documenting information in the medical record.    Overall low suspicion of systemic inflammatory rheumatic disease at this time.  I will have the patient return on an as-needed basis and  continue to follow with their primary provider and mental health provider.  Patient understands that should they have any rheumatologic concerns in the future to give us a call and I will be happy to re-evaluate.  It was a pleasure to see the patient in clinic today.  Thank you for allowing me to participate in the care of this patient    Follow Up:   Return if symptoms worsen or fail to improve.      Patient or patient representative verbalized consent for the use of Ambient Listening during the visit with  Gabe Fairchild MD for chart documentation. 4/25/2025  13:41 EDT        Gabe Fairchild MD  [facility]

## 2025-04-26 LAB
25(OH)D3 SERPL-MCNC: 14 NG/ML (ref 30–100)
ALBUMIN SERPL-MCNC: 4.3 G/DL (ref 3.5–5.2)
ALBUMIN/GLOB SERPL: 1.3 G/DL
ALP SERPL-CCNC: 85 U/L (ref 39–117)
ALT SERPL W P-5'-P-CCNC: 23 U/L (ref 1–33)
ANION GAP SERPL CALCULATED.3IONS-SCNC: 10 MMOL/L (ref 5–15)
AST SERPL-CCNC: 23 U/L (ref 1–32)
BILIRUB SERPL-MCNC: 0.3 MG/DL (ref 0–1.2)
BUN SERPL-MCNC: 7 MG/DL (ref 6–20)
BUN/CREAT SERPL: 8.3 (ref 7–25)
CALCIUM SPEC-SCNC: 9 MG/DL (ref 8.6–10.5)
CHLORIDE SERPL-SCNC: 105 MMOL/L (ref 98–107)
CO2 SERPL-SCNC: 25 MMOL/L (ref 22–29)
CORTIS SERPL-MCNC: 3.37 MCG/DL
CREAT SERPL-MCNC: 0.84 MG/DL (ref 0.57–1)
CRP SERPL-MCNC: 0.52 MG/DL (ref 0–0.5)
EGFRCR SERPLBLD CKD-EPI 2021: 93.1 ML/MIN/1.73
GLOBULIN UR ELPH-MCNC: 3.3 GM/DL
GLUCOSE SERPL-MCNC: 91 MG/DL (ref 65–99)
HAV IGM SERPL QL IA: NORMAL
HBV CORE IGM SERPL QL IA: NORMAL
HBV SURFACE AG SERPL QL IA: NORMAL
HCV AB SER QL: NORMAL
POTASSIUM SERPL-SCNC: 4.1 MMOL/L (ref 3.5–5.2)
PROT SERPL-MCNC: 7.6 G/DL (ref 6–8.5)
SODIUM SERPL-SCNC: 140 MMOL/L (ref 136–145)
TSH SERPL DL<=0.05 MIU/L-ACNC: 2.57 UIU/ML (ref 0.27–4.2)
VIT B12 BLD-MCNC: 239 PG/ML (ref 211–946)

## 2025-04-28 DIAGNOSIS — M79.7 FIBROMYALGIA: ICD-10-CM

## 2025-04-29 RX ORDER — DULOXETIN HYDROCHLORIDE 60 MG/1
60 CAPSULE, DELAYED RELEASE ORAL DAILY
Qty: 30 CAPSULE | Refills: 0 | Status: SHIPPED | OUTPATIENT
Start: 2025-04-29

## 2025-05-07 ENCOUNTER — OFFICE VISIT (OUTPATIENT)
Dept: ORTHOPEDIC SURGERY | Facility: CLINIC | Age: 35
End: 2025-05-07
Payer: MEDICAID

## 2025-05-07 VITALS
SYSTOLIC BLOOD PRESSURE: 138 MMHG | WEIGHT: 260.6 LBS | BODY MASS INDEX: 46.18 KG/M2 | HEIGHT: 63 IN | DIASTOLIC BLOOD PRESSURE: 84 MMHG

## 2025-05-07 DIAGNOSIS — M25.552 CHRONIC LEFT HIP PAIN: Primary | ICD-10-CM

## 2025-05-07 DIAGNOSIS — G89.29 CHRONIC LEFT HIP PAIN: Primary | ICD-10-CM

## 2025-05-07 PROCEDURE — 99204 OFFICE O/P NEW MOD 45 MIN: CPT | Performed by: ORTHOPAEDIC SURGERY

## 2025-05-07 PROCEDURE — 1159F MED LIST DOCD IN RCRD: CPT | Performed by: ORTHOPAEDIC SURGERY

## 2025-05-07 PROCEDURE — 1160F RVW MEDS BY RX/DR IN RCRD: CPT | Performed by: ORTHOPAEDIC SURGERY

## 2025-05-07 NOTE — PROGRESS NOTES
[facility] [place] [facility] Note    Subjective     Chief Complaint   Patient presents with    Left Hip - Pain        HPI    Nicole Jo is a 34 y.o. female who presents with new problem of: left hip pain.  Onset: atraumatic and gradual in nature. The issue has been ongoing for 3 year(s). Pain is a 5/10 on the pain scale. Pain is described as aching and burning. Associated symptoms include pain, popping, grinding, and stiffness. The pain is worse with walking, standing, sitting, climbing stairs, sleeping, leisure, lying on affected side, rising from seated position, and any movement of the joint; pain medication and/or NSAID improve the pain. Previous treatments have included: nothing.  No specific history of trauma.  Pain is located primarily on the lateral aspect of the hip.  She does have a known history of fibromyalgia.    I have reviewed the following portions of the patient's history and agree with: History of Present Illness and Review of Systems    Patient Active Problem List   Diagnosis    Depression    Insomnia    PCOS (polycystic ovarian syndrome)    Migraine    Seasonal affective disorder    Environmental and seasonal allergies    Class 3 severe obesity due to excess calories without serious comorbidity with body mass index (BMI) of 45.0 to 49.9 in adult    Chronic pain of multiple joints    Myalgia    NSAID long-term use    Elevated TSH    Well adult exam    Fibromyalgia    Chronic left hip pain    Other fatigue     Past Medical History:   Diagnosis Date    Acromioclavicular separation     there is some thing going on with it, I'm not sure what    Acute pharyngitis     ADHD (attention deficit hyperactivity disorder)     elementary school dx    Anxiety     Depression     Dislocation, shoulder     is it dislocated? maybe?    Eustachian tube dysfunction     Fibromyalgia, primary     Hypertension     Insomnia     Joint pain, knee     Lumbosacral disc disease not sure/by Dr Garrido    L4/l5 like to  compress    Migraine     Premenstrual dysphoric disorder     Upper respiratory infection     Weight gain       Past Surgical History:   Procedure Laterality Date    SALPINGECTOMY Bilateral     TONSILLECTOMY      WISDOM TOOTH EXTRACTION        Family History   Problem Relation Age of Onset    Bipolar disorder Mother     Arthritis Mother     Anxiety disorder Mother     Depression Mother     Irritable bowel syndrome Mother     Endometriosis Mother     LOGAN disease Mother     Cancer Mother         pancreatic cancer    Hyperlipidemia Mother     Mental illness Mother         bipolar/manic depressive    Miscarriages / Stillbirths Mother     Other Mother         chronic pain, ibs, acid reflux, endometriosis    Depression Father     Hypertension Father     Hyperlipidemia Father     Arthritis Father     Asthma Father     Diabetes Father     Mental illness Father         depression    ADD / ADHD Brother     Bipolar disorder Brother     Hepatitis Brother         C    Drug abuse Brother     ADD / ADHD Brother     Drug abuse Brother     Transient ischemic attack Maternal Grandmother     Stroke Maternal Grandmother     Hypertension Maternal Grandmother     Dementia Maternal Grandmother     Osteoporosis Maternal Grandmother     Other Maternal Grandmother         stroke, dementia, hpertension, osteoporosis    Lymphoma Maternal Grandfather     Meniere's disease Maternal Grandfather     Other Maternal Grandfather         non-hodgkins lymphoma    Arthritis Paternal Grandmother     Stroke Paternal Grandmother     Hypertension Paternal Grandmother     Other Paternal Grandmother         blood clot unknown site    Pancreatic cancer Paternal Grandfather     Hypertension Paternal Grandfather     Hyperlipidemia Paternal Grandfather     Diverticulosis Paternal Grandfather     Other Paternal Grandfather         cancer,  of heart attack    Cancer Paternal Grandfather         pancreatic cancer    Developmental Disability Brother         add     Drug abuse Brother     Developmental Disability Brother         adhd    Drug abuse Brother     Learning disabilities Brother         adhd     Social History     Socioeconomic History    Marital status:    Tobacco Use    Smoking status: Never     Passive exposure: Never    Smokeless tobacco: Never    Tobacco comments:     n/a I smoke weed   Vaping Use    Vaping status: Every Day    Substances: THC (3 gram)    Devices: Disposable    Passive vaping exposure: Yes   Substance and Sexual Activity    Alcohol use: Not Currently     Comment: once or twice a month, if that    Drug use: Yes     Frequency: 7.0 times per week     Types: Marijuana     Comment: smoke/125mg gummy everyday for pain    Sexual activity: Not Currently     Partners: Female, Male     Birth control/protection: Nexplanon, Bilateral salpingectomy , Surgical, Implant     Comment: bilateral saplingectomy      Current Outpatient Medications on File Prior to Visit   Medication Sig Dispense Refill    celecoxib (CeleBREX) 200 MG capsule Take 1 capsule by mouth 2 (Two) Times a Day. 60 capsule 5    cyclobenzaprine (FLEXERIL) 10 MG tablet Take 1 tablet by mouth 2 (Two) Times a Day As Needed for Muscle Spasms. 180 tablet 3    DULoxetine (CYMBALTA) 60 MG capsule Take 1 capsule by mouth once daily 30 capsule 0    Etonogestrel (NEXPLANON SC) Inject  under the skin into the appropriate area as directed. Implanted August 30 of 2023      propranolol (INDERAL) 10 MG tablet Take 1 tablet by mouth 2 (Two) Times a Day. 180 tablet 3    spironolactone (ALDACTONE) 50 MG tablet Take 1 tablet by mouth Daily. 90 tablet 3    SUMAtriptan (Imitrex) 100 MG tablet Take one tablet at onset of headache. May repeat dose one time in 2 hours if headache not relieved. 9 tablet 3    vitamin D (ERGOCALCIFEROL) 1.25 MG (68601 UT) capsule capsule Take 1 capsule by mouth 1 (One) Time Per Week. 12 capsule 0    Turmeric 500 MG capsule Take 1 capsule by mouth 2 (Two) Times a Day. (Patient not  taking: Reported on 5/7/2025) 180 capsule 3     No current facility-administered medications on file prior to visit.      Allergies   Allergen Reactions    Baclofen Hallucinations    Diclofenac Hallucinations    Cephalexin Unknown - Low Severity        Review of Systems   Constitutional:  Negative for activity change, appetite change, chills, diaphoresis, fatigue, fever and unexpected weight change.   HENT:  Negative for congestion, dental problem, drooling, ear discharge, ear pain, facial swelling, hearing loss, mouth sores, nosebleeds, postnasal drip, rhinorrhea, sinus pressure, sneezing, sore throat, tinnitus, trouble swallowing and voice change.    Eyes:  Negative for photophobia, pain, discharge, redness, itching and visual disturbance.   Respiratory:  Negative for apnea, cough, choking, chest tightness, shortness of breath, wheezing and stridor.    Cardiovascular:  Negative for chest pain, palpitations and leg swelling.   Gastrointestinal:  Negative for abdominal distention, abdominal pain, anal bleeding, blood in stool, constipation, diarrhea, nausea, rectal pain and vomiting.   Endocrine: Negative for cold intolerance, heat intolerance, polydipsia, polyphagia and polyuria.   Genitourinary:  Negative for decreased urine volume, difficulty urinating, dysuria, enuresis, flank pain, frequency, genital sores, hematuria and urgency.   Musculoskeletal:  Positive for arthralgias. Negative for back pain, gait problem, joint swelling, myalgias, neck pain and neck stiffness.   Skin:  Negative for color change, pallor, rash and wound.   Allergic/Immunologic: Negative for environmental allergies, food allergies and immunocompromised state.   Neurological:  Negative for dizziness, tremors, seizures, syncope, facial asymmetry, speech difficulty, weakness, light-headedness, numbness and headaches.   Hematological:  Negative for adenopathy. Does not bruise/bleed easily.   Psychiatric/Behavioral:  Negative for agitation,  "behavioral problems, confusion, decreased concentration, dysphoric mood, hallucinations, self-injury, sleep disturbance and suicidal ideas. The patient is not nervous/anxious and is not hyperactive.         Objective      Physical Exam  /84   Ht 160 cm (63\")   Wt 118 kg (260 lb 9.6 oz)   BMI 46.16 kg/m²     Body mass index is 46.16 kg/m².           General:   Mental Status:  Alert   Appearance: Cooperative, in no acute distress   Build and Nutrition: Obese by BMI female   Orientation: Alert and oriented to person, place and time   Posture: Normal   Gait: Nonantalgic    Integument:   Left hip: No skin lesions, no rash, no ecchymosis    Neurologic:   Motor:  Left lower extremity: 5/5 quadriceps, hamstrings, ankle dorsiflexors, and ankle plantar flexors    Lower Extremity:   Left Hip:    Tenderness:  None    Swelling:  None    Crepitus:  None    Atrophy:  None    Range of motion:  External Rotation: 30°, no pain       Internal Rotation: 30°, no pain       Flexion:  100°       Extension:  0°   Instability:  None  Deformities:  None  [facility]    No leg length discrepancy      Imaging/Studies      Imaging Results (Last 24 Hours)       ** No results found for the last 24 hours. **          XR HIP W OR WO PELVIS 2-3 VIEW LEFT     Date of Exam: 4/25/2025 9:46 AM EDT     Indication: pain     Comparison: None available.     Findings:  No acute fracture or dislocation. Joint spaces well-maintained. Bone mineralization is normal. SI joints are normal. Soft tissues are normal.     IMPRESSION:  Impression:  No acute fracture or dislocation.           Electronically Signed: Prince Adamson MD    4/30/2025 4:10 PM EDT    Workstation ID: [id number]    I reviewed the above imaging, and agree with findings.    Assessment and Plan     Diagnoses and all orders for this visit:    1. Chronic left hip pain (Primary)  -     MRI Hip Left Without Contrast; Future        1. Chronic left hip pain  "         Reviewed my findings with the patient.  Plain radiographs show no bony abnormality.  At this point I recommend an MRI of her left hip, based on the duration of her symptoms.  This likely could be associated with her fibromyalgia.  I will see her back after the MRI, but I will be happy to see her back sooner for any problems.    Return for after imaging study.      Gerald Vidales MD  05/07/25  09:11 EDT      Dictated Utilizing Dragon Dictation

## 2025-05-20 ENCOUNTER — TELEPHONE (OUTPATIENT)
Age: 35
End: 2025-05-20
Payer: MEDICAID

## 2025-05-20 NOTE — TELEPHONE ENCOUNTER
Hub staff attempted to follow warm transfer process and was unsuccessful     Caller: Nicole Jo    Relationship to patient: Self    Best call back number: [phone]    Patient is needing: PT RETURNING CALL TO DIONTE, NOELLE TO WT. SHE JUST WANTED TO LET HER KNOW SHE ALREADY GOT IN WITH A PHYSICAL THERAPIST.

## 2025-05-20 NOTE — TELEPHONE ENCOUNTER
Spoke with physical therapy and confirmed that patient was seen for an appointment with [facility] in [place], [place]. Patient has been advised to have them send us the visit note since we did not set up the appointment for her.

## 2025-05-25 DIAGNOSIS — M79.7 FIBROMYALGIA: ICD-10-CM

## 2025-05-27 RX ORDER — DULOXETIN HYDROCHLORIDE 60 MG/1
60 CAPSULE, DELAYED RELEASE ORAL DAILY
Qty: 30 CAPSULE | Refills: 0 | Status: SHIPPED | OUTPATIENT
Start: 2025-05-27

## 2025-05-29 ENCOUNTER — HOSPITAL ENCOUNTER (OUTPATIENT)
Dept: MRI IMAGING | Facility: HOSPITAL | Age: 35
Discharge: HOME | End: 2025-05-29
Admitting: ORTHOPAEDIC SURGERY
Payer: MEDICAID

## 2025-05-29 DIAGNOSIS — M25.552 CHRONIC LEFT HIP PAIN: ICD-10-CM

## 2025-05-29 DIAGNOSIS — G89.29 CHRONIC LEFT HIP PAIN: ICD-10-CM

## 2025-05-29 PROCEDURE — 73721 MRI JNT OF LWR EXTRE W/O DYE: CPT

## 2025-06-17 ENCOUNTER — OFFICE VISIT (OUTPATIENT)
Dept: FAMILY MEDICINE CLINIC | Facility: CLINIC | Age: 35
End: 2025-06-17
Payer: MEDICAID

## 2025-06-17 ENCOUNTER — LAB (OUTPATIENT)
Dept: LAB | Facility: HOSPITAL | Age: 35
End: 2025-06-17
Payer: MEDICAID

## 2025-06-17 VITALS
WEIGHT: 261.8 LBS | HEIGHT: 63 IN | OXYGEN SATURATION: 98 % | RESPIRATION RATE: 20 BRPM | DIASTOLIC BLOOD PRESSURE: 60 MMHG | SYSTOLIC BLOOD PRESSURE: 100 MMHG | BODY MASS INDEX: 46.39 KG/M2 | TEMPERATURE: 98.6 F | HEART RATE: 66 BPM

## 2025-06-17 DIAGNOSIS — M25.552 CHRONIC LEFT HIP PAIN: ICD-10-CM

## 2025-06-17 DIAGNOSIS — E66.01 CLASS 3 SEVERE OBESITY DUE TO EXCESS CALORIES WITHOUT SERIOUS COMORBIDITY WITH BODY MASS INDEX (BMI) OF 45.0 TO 49.9 IN ADULT: ICD-10-CM

## 2025-06-17 DIAGNOSIS — M51.362 DEGENERATION OF INTERVERTEBRAL DISC OF LUMBAR REGION WITH DISCOGENIC BACK PAIN AND LOWER EXTREMITY PAIN: ICD-10-CM

## 2025-06-17 DIAGNOSIS — R53.83 OTHER FATIGUE: ICD-10-CM

## 2025-06-17 DIAGNOSIS — Z51.81 THERAPEUTIC DRUG MONITORING: ICD-10-CM

## 2025-06-17 DIAGNOSIS — M79.7 FIBROMYALGIA: ICD-10-CM

## 2025-06-17 DIAGNOSIS — E66.813 CLASS 3 SEVERE OBESITY DUE TO EXCESS CALORIES WITHOUT SERIOUS COMORBIDITY WITH BODY MASS INDEX (BMI) OF 45.0 TO 49.9 IN ADULT: ICD-10-CM

## 2025-06-17 DIAGNOSIS — R73.03 PREDIABETES: ICD-10-CM

## 2025-06-17 DIAGNOSIS — E55.9 VITAMIN D DEFICIENCY: ICD-10-CM

## 2025-06-17 DIAGNOSIS — E28.2 PCOS (POLYCYSTIC OVARIAN SYNDROME): ICD-10-CM

## 2025-06-17 DIAGNOSIS — E53.8 B12 DEFICIENCY: ICD-10-CM

## 2025-06-17 DIAGNOSIS — G89.29 CHRONIC LEFT HIP PAIN: ICD-10-CM

## 2025-06-17 DIAGNOSIS — Z00.00 ENCOUNTER FOR ANNUAL PHYSICAL EXAM: Primary | ICD-10-CM

## 2025-06-17 LAB
25(OH)D3 SERPL-MCNC: 31.1 NG/ML (ref 30–100)
CHOLEST SERPL-MCNC: 183 MG/DL (ref 0–200)
HBA1C MFR BLD: 5.5 % (ref 4.8–5.6)
HDLC SERPL-MCNC: 36 MG/DL (ref 40–60)
LDLC SERPL CALC-MCNC: 137 MG/DL (ref 0–100)
LDLC/HDLC SERPL: 3.79 {RATIO}
TRIGL SERPL-MCNC: 52 MG/DL (ref 0–150)
VIT B12 BLD-MCNC: 265 PG/ML (ref 211–946)
VLDLC SERPL-MCNC: 10 MG/DL (ref 5–40)

## 2025-06-17 PROCEDURE — 1126F AMNT PAIN NOTED NONE PRSNT: CPT | Performed by: FAMILY MEDICINE

## 2025-06-17 PROCEDURE — 82306 VITAMIN D 25 HYDROXY: CPT | Performed by: FAMILY MEDICINE

## 2025-06-17 PROCEDURE — 83036 HEMOGLOBIN GLYCOSYLATED A1C: CPT | Performed by: FAMILY MEDICINE

## 2025-06-17 PROCEDURE — 1159F MED LIST DOCD IN RCRD: CPT | Performed by: FAMILY MEDICINE

## 2025-06-17 PROCEDURE — 2014F MENTAL STATUS ASSESS: CPT | Performed by: FAMILY MEDICINE

## 2025-06-17 PROCEDURE — 99395 PREV VISIT EST AGE 18-39: CPT | Performed by: FAMILY MEDICINE

## 2025-06-17 PROCEDURE — 80061 LIPID PANEL: CPT | Performed by: FAMILY MEDICINE

## 2025-06-17 PROCEDURE — 82607 VITAMIN B-12: CPT | Performed by: FAMILY MEDICINE

## 2025-06-17 PROCEDURE — 1160F RVW MEDS BY RX/DR IN RCRD: CPT | Performed by: FAMILY MEDICINE

## 2025-06-17 RX ORDER — PREGABALIN 50 MG/1
50 CAPSULE ORAL 2 TIMES DAILY
Qty: 60 CAPSULE | Refills: 2 | Status: SHIPPED | OUTPATIENT
Start: 2025-06-17

## 2025-06-17 RX ORDER — DULOXETIN HYDROCHLORIDE 60 MG/1
60 CAPSULE, DELAYED RELEASE ORAL DAILY
Qty: 90 CAPSULE | Refills: 0 | Status: SHIPPED | OUTPATIENT
Start: 2025-06-17

## 2025-06-17 NOTE — PROGRESS NOTES
Subjective     Chief Complaint  Annual Exam    Subjective          Nicole Jo is a 34 y.o. female who presents today to [facility] for follow up.    HPI:   History of Present Illness    History of Present Illness  The patient is a 34-year-old female who presents today for an annual physical.    She reports feeling fatigued and experiencing pain during her physical therapy sessions. She has been diagnosed with a tear in her femur, which is causing discomfort in other muscle groups. Physical therapy has mostly helped with the pain, but she now experiences pain in other muscle groups. She also experiences radiating pain down her leg. Her physical therapist suspects bursitis and tendinitis. She recalls a sensation of her bone structure shifting during the initial weeks of physical therapy, accompanied by back pain. She is scheduled to consult with an orthopedic specialist at [facility], [place], operated by [facility] next week to discuss her MRI results. She has not yet undergone an MRI of her lower back.    She continues her Cymbalta regimen and has not started Lyrica, as suggested by Dr. Vidales. She has previously tried gabapentin, which provided relief but left her feeling excessively sedated.    Her vitamin D level was found to be low at 14, prompting her to start a weekly vitamin D supplement. She has completed the first round of paperwork for disability. She reports a significant reduction in stress following the removal of a problematic roommate.      The following portions of the patient's history were reviewed and updated as appropriate: allergies, current medications, past family history, past medical history, past social history, past surgical history and problem list.    Objective     Objective     Allergy:   Allergies   Allergen Reactions    Baclofen Hallucinations    Diclofenac Hallucinations    Cephalexin Unknown - Low Severity        Current Medications:   Current Outpatient Medications    Medication Sig Dispense Refill    celecoxib (CeleBREX) 200 MG capsule Take 1 capsule by mouth 2 (Two) Times a Day. 60 capsule 5    cyclobenzaprine (FLEXERIL) 10 MG tablet Take 1 tablet by mouth 2 (Two) Times a Day As Needed for Muscle Spasms. 180 tablet 3    DULoxetine (CYMBALTA) 60 MG capsule Take 1 capsule by mouth Daily. 90 capsule 0    Etonogestrel (NEXPLANON SC) Inject  under the skin into the appropriate area as directed. Implanted August 30 of 2023      propranolol (INDERAL) 10 MG tablet Take 1 tablet by mouth 2 (Two) Times a Day. 180 tablet 3    spironolactone (ALDACTONE) 50 MG tablet Take 1 tablet by mouth Daily. 90 tablet 3    SUMAtriptan (Imitrex) 100 MG tablet Take one tablet at onset of headache. May repeat dose one time in 2 hours if headache not relieved. 9 tablet 3    vitamin D (ERGOCALCIFEROL) 1.25 MG (13377 UT) capsule capsule Take 1 capsule by mouth 1 (One) Time Per Week. 12 capsule 0    pregabalin (Lyrica) 50 MG capsule Take 1 capsule by mouth 2 (Two) Times a Day. 60 capsule 2    Turmeric 500 MG capsule Take 1 capsule by mouth 2 (Two) Times a Day. (Patient not taking: Reported on 4/25/2025) 180 capsule 3     No current facility-administered medications for this visit.       Past Medical History:  Past Medical History:   Diagnosis Date    Acromioclavicular separation     there is some thing going on with it, I'm not sure what    Acute pharyngitis     ADHD (attention deficit hyperactivity disorder)     elementary school dx    Anxiety     Depression     Dislocation, shoulder     is it dislocated? maybe?    Eustachian tube dysfunction     Fibromyalgia, primary     Hypertension     Insomnia     Joint pain, knee     Lumbosacral disc disease not sure/by Dr Garrido    L4/l5 like to compress    Migraine     Premenstrual dysphoric disorder     Upper respiratory infection     Weight gain        Past Surgical History:  Past Surgical History:   Procedure Laterality Date    SALPINGECTOMY Bilateral      TONSILLECTOMY      WISDOM TOOTH EXTRACTION         Social History:  Social History     Socioeconomic History    Marital status:    Tobacco Use    Smoking status: Never     Passive exposure: Never    Smokeless tobacco: Never    Tobacco comments:     n/a I smoke weed   Vaping Use    Vaping status: Every Day    Substances: THC (3 gram)    Devices: Disposable    Passive vaping exposure: Yes   Substance and Sexual Activity    Alcohol use: Not Currently     Comment: once or twice a month, if that    Drug use: Yes     Frequency: 7.0 times per week     Types: Marijuana     Comment: smoke/125mg gummy everyday for pain    Sexual activity: Not Currently     Partners: Female, Male     Birth control/protection: Surgical, Implant     Comment: bilateral saplingectomy       Family History:  Family History   Problem Relation Age of Onset    Bipolar disorder Mother     Arthritis Mother     Anxiety disorder Mother     Depression Mother     Irritable bowel syndrome Mother     Endometriosis Mother     LOGAN disease Mother     Cancer Mother         pancreatic cancer    Hyperlipidemia Mother     Mental illness Mother         bipolar/manic depressive    Miscarriages / Stillbirths Mother     Other Mother         chronic pain, ibs, acid reflux, endometriosis    Depression Father     Hypertension Father     Hyperlipidemia Father     Arthritis Father     Asthma Father     Diabetes Father     Mental illness Father         depression    ADD / ADHD Brother     Bipolar disorder Brother     Hepatitis Brother         C    Drug abuse Brother     ADD / ADHD Brother     Drug abuse Brother     Transient ischemic attack Maternal Grandmother     Stroke Maternal Grandmother     Hypertension Maternal Grandmother     Dementia Maternal Grandmother     Osteoporosis Maternal Grandmother     Other Maternal Grandmother         stroke, dementia, hpertension, osteoporosis    Lymphoma Maternal Grandfather     Meniere's disease Maternal Grandfather     Other  "Maternal Grandfather         non-hodgkins lymphoma    Arthritis Paternal Grandmother     Stroke Paternal Grandmother     Hypertension Paternal Grandmother     Other Paternal Grandmother         blood clot unknown site    Pancreatic cancer Paternal Grandfather     Hypertension Paternal Grandfather     Hyperlipidemia Paternal Grandfather     Diverticulosis Paternal Grandfather     Other Paternal Grandfather         cancer,  of heart attack    Cancer Paternal Grandfather         pancreatic cancer    Developmental Disability Brother         add    Drug abuse Brother     Developmental Disability Brother         adhd    Drug abuse Brother     Learning disabilities Brother         adhd         Vital Signs:   /60 (BP Location: Right arm, Patient Position: Sitting, Cuff Size: Adult)   Pulse 66   Temp 98.6 °F (37 °C) (Temporal)   Resp 20   Ht 160 cm (62.99\")   Wt 119 kg (261 lb 12.8 oz)   SpO2 98%   BMI 46.39 kg/m²      Physical Exam:  Physical Exam  Vitals reviewed.   Constitutional:       Appearance: She is not ill-appearing.   Eyes:      Pupils: Pupils are equal, round, and reactive to light.   Cardiovascular:      Rate and Rhythm: Normal rate.      Pulses: Normal pulses.   Pulmonary:      Effort: Pulmonary effort is normal.      Breath sounds: Normal breath sounds.   Neurological:      General: No focal deficit present.      Mental Status: She is alert. Mental status is at baseline.   Psychiatric:         Mood and Affect: Mood normal.         Behavior: Behavior normal.         Thought Content: Thought content normal.         Judgment: Judgment normal.             PHQ-9 Score  PHQ-9 Total Score:      Lab Review  Lab on 2025   Component Date Value Ref Range Status    WBC 2025 7.64  3.40 - 10.80 10*3/mm3 Final    RBC 2025 4.79  3.77 - 5.28 10*6/mm3 Final    Hemoglobin 2025 13.6  12.0 - 15.9 g/dL Final    Hematocrit 2025 42.6  34.0 - 46.6 % Final    MCV 2025 88.9  79.0 - " 97.0 fL Final    MCH 04/25/2025 28.4  26.6 - 33.0 pg Final    MCHC 04/25/2025 31.9  31.5 - 35.7 g/dL Final    RDW 04/25/2025 13.5  12.3 - 15.4 % Final    RDW-SD 04/25/2025 43.4  37.0 - 54.0 fl Final    MPV 04/25/2025 10.6  6.0 - 12.0 fL Final    Platelets 04/25/2025 416  140 - 450 10*3/mm3 Final    Neutrophil % 04/25/2025 62.5  42.7 - 76.0 % Final    Lymphocyte % 04/25/2025 26.4  19.6 - 45.3 % Final    Monocyte % 04/25/2025 8.9  5.0 - 12.0 % Final    Eosinophil % 04/25/2025 1.4  0.3 - 6.2 % Final    Basophil % 04/25/2025 0.5  0.0 - 1.5 % Final    Immature Grans % 04/25/2025 0.3  0.0 - 0.5 % Final    Neutrophils, Absolute 04/25/2025 4.77  1.70 - 7.00 10*3/mm3 Final    Lymphocytes, Absolute 04/25/2025 2.02  0.70 - 3.10 10*3/mm3 Final    Monocytes, Absolute 04/25/2025 0.68  0.10 - 0.90 10*3/mm3 Final    Eosinophils, Absolute 04/25/2025 0.11  0.00 - 0.40 10*3/mm3 Final    Basophils, Absolute 04/25/2025 0.04  0.00 - 0.20 10*3/mm3 Final    Immature Grans, Absolute 04/25/2025 0.02  0.00 - 0.05 10*3/mm3 Final    nRBC 04/25/2025 0.0  0.0 - 0.2 /100 WBC Final    C-Reactive Protein 04/25/2025 0.52 (H)  0.00 - 0.50 mg/dL Final    Glucose 04/25/2025 91  65 - 99 mg/dL Final    BUN 04/25/2025 7  6 - 20 mg/dL Final    Creatinine 04/25/2025 0.84  0.57 - 1.00 mg/dL Final    Sodium 04/25/2025 140  136 - 145 mmol/L Final    Potassium 04/25/2025 4.1  3.5 - 5.2 mmol/L Final    Chloride 04/25/2025 105  98 - 107 mmol/L Final    CO2 04/25/2025 25.0  22.0 - 29.0 mmol/L Final    Calcium 04/25/2025 9.0  8.6 - 10.5 mg/dL Final    Total Protein 04/25/2025 7.6  6.0 - 8.5 g/dL Final    Albumin 04/25/2025 4.3  3.5 - 5.2 g/dL Final    ALT (SGPT) 04/25/2025 23  1 - 33 U/L Final    AST (SGOT) 04/25/2025 23  1 - 32 U/L Final    Alkaline Phosphatase 04/25/2025 85  39 - 117 U/L Final    Total Bilirubin 04/25/2025 0.3  0.0 - 1.2 mg/dL Final    Globulin 04/25/2025 3.3  gm/dL Final    A/G Ratio 04/25/2025 1.3  g/dL Final    BUN/Creatinine Ratio 04/25/2025  8.3  7.0 - 25.0 Final    Anion Gap 04/25/2025 10.0  5.0 - 15.0 mmol/L Final    eGFR 04/25/2025 93.1  >60.0 mL/min/1.73 Final    Sed Rate 04/25/2025 29 (H)  0 - 20 mm/hr Final    Vitamin B-12 04/25/2025 239  211 - 946 pg/mL Final    25 Hydroxy, Vitamin D 04/25/2025 14.0 (L)  30.0 - 100.0 ng/ml Final    Hepatitis B Surface Ag 04/25/2025 Non-Reactive  Non-Reactive Final    Hep A IgM 04/25/2025 Non-Reactive  Non-Reactive Final    Hep B C IgM 04/25/2025 Non-Reactive  Non-Reactive Final    Hepatitis C Ab 04/25/2025 Non-Reactive  Non-Reactive Final    TSH 04/25/2025 2.570  0.270 - 4.200 uIU/mL Final    Cortisol 04/25/2025 3.37    mcg/dL Final        Radiology Results  MRI Hip Left Without Contrast  Result Date: 6/2/2025  Impression: Impression: 1.No acute osseous abnormality of the left hip. 2.Mild right distal gluteal tendinopathy. 3.There is suggestion of mild focal left L5 neural foraminal narrowing. 4.Probable focal tearing of the anterior superior left acetabular labrum. No paralabral cyst formation. Electronically Signed: Rodolfo Maravilla MD  6/2/2025 7:48 PM EDT  Workstation ID: [id number]       Assessment / Plan         Assessment and Plan   Diagnoses and all orders for this visit:    1. Encounter for annual physical exam (Primary)  Assessment & Plan:  Annual wellness exam completed today. Health Maintenance including immunizations was updated and reflected in the chart. Yearly screening labs were ordered.     Further recommendations to be given once lab data received.     Health advice: healthy food choices with fresh fruits and vegetables, maintain sleep pattern at least 8 hours, avoid texting and distracted driving practices; wear safety belt, engage in regular exercise, maintain healthy weight, use safe sex practices, avoid alcohol and illicit drugs. Maintain immunizations that are up to date. Maintain health maintenance:PAP Smear, etc.  Follow up with PCP if struggling with depression or anxiety. Keep regular dental  and eye exams. Brush and floss teeth daily.     I suggest they take a daily multivitamin that is age-appropriate (example women's One-A-Day.)  Patient voiced understanding of these instructions.     Follow up Annually for Physical       Orders:  -     Hemoglobin A1c; Future  -     Lipid Panel; Future    2. Fibromyalgia  -     DULoxetine (CYMBALTA) 60 MG capsule; Take 1 capsule by mouth Daily.  Dispense: 90 capsule; Refill: 0    3. Chronic left hip pain    4. Class 3 severe obesity due to excess calories without serious comorbidity with body mass index (BMI) of 45.0 to 49.9 in adult    5. PCOS (polycystic ovarian syndrome)    6. Other fatigue    7. Degeneration of intervertebral disc of lumbar region with discogenic back pain and lower extremity pain  -     MRI Lumbar Spine Without Contrast; Future  -     pregabalin (Lyrica) 50 MG capsule; Take 1 capsule by mouth 2 (Two) Times a Day.  Dispense: 60 capsule; Refill: 2    8. Therapeutic drug monitoring  -     Compliance Drug Analysis, Ur - Urine, Clean Catch; Future    9. Prediabetes  -     Hemoglobin A1c; Future    10. Vitamin D deficiency  -     Vitamin D,25-Hydroxy; Future    11. B12 deficiency  -     Vitamin B12; Future        Assessment & Plan  1. Annual physical examination.  - Vitamin D levels are suboptimal, necessitating a re-evaluation to determine if an increase in dosage is required.  - B12 levels, although within the normal range, are on the lower end, thus warranting a recheck.  - A controlled substance agreement will be signed for Lyrica.  - Blood work will be ordered to assess cholesterol, A1c, vitamin D, and B12 levels. If B12 levels have decreased, an injection may be considered; otherwise, oral replacement will be continued.    2. Hip pain.  - Reports ongoing hip pain and has been undergoing physical therapy, which has been exhausting and painful.  - MRI has revealed a tear in the femur and suggestion of mild focal left L5 narrowing in the lumbar  spine, along with tendinopathy within the gluteal region.  - Will follow up with the orthopedic specialist at [facility] next week to review MRI results.  - A trial of Lyrica will be initiated to manage symptoms, particularly for fibromyalgia and other structural issues contributing to her pain.    3. Prediabetes.  - A1c levels were within the prediabetes range a year ago.  - Blood work will be ordered to reassess A1c levels to ensure they are not trending upwards.    Follow-up  The patient will follow up in 3 months or sooner if necessary.      Discussed possible differential diagnoses, testing, treatment, recommended non-pharmacological interventions, risks, warning signs to monitor for that would indicate need for follow-up in clinic or ER. If no improvement with these regimens or you have new or worsening symptoms follow-up. Patient verbalizes understanding and agreement with plan of care. Denies further needs or concerns.     Patient was given instructions and counseling regarding her condition and for health maintenance advised.             Health Maintenance  Health Maintenance: There are no preventive care reminders to display for this patient.     Meds ordered during this visit  New Medications Ordered This Visit   Medications    DULoxetine (CYMBALTA) 60 MG capsule     Sig: Take 1 capsule by mouth Daily.     Dispense:  90 capsule     Refill:  0    pregabalin (Lyrica) 50 MG capsule     Sig: Take 1 capsule by mouth 2 (Two) Times a Day.     Dispense:  60 capsule     Refill:  2       Meds stopped during this visit:  Medications Discontinued During This Encounter   Medication Reason    DULoxetine (CYMBALTA) 60 MG capsule Reorder        Visit Diagnoses    ICD-10-CM ICD-9-CM   1. Encounter for annual physical exam  Z00.00 V70.0   2. Fibromyalgia  M79.7 729.1   3. Chronic left hip pain  M25.552 719.45    G89.29 338.29   4. Class 3 severe obesity due to excess calories without serious comorbidity with body mass index  (BMI) of 45.0 to 49.9 in adult  E66.813 278.01    Z68.42 V85.42    E66.01    5. PCOS (polycystic ovarian syndrome)  E28.2 256.4   6. Other fatigue  R53.83 780.79   7. Degeneration of intervertebral disc of lumbar region with discogenic back pain and lower extremity pain  M51.362 722.52   8. Therapeutic drug monitoring  Z51.81 V58.83   9. Prediabetes  R73.03 790.29   10. Vitamin D deficiency  E55.9 268.9   11. B12 deficiency  E53.8 266.2       Patient was given instructions and counseling regarding her condition or for health maintenance advice. Please see specific information pulled into the AVS if appropriate.     Follow Up   Return in about 3 months (around 9/17/2025) for followup Fibromyalgia, Low back pain .      Patient or patient representative verbalized consent for the use of Ambient Listening during the visit with  Shanti Garrido DO for chart documentation. 6/17/2025  11:05 EDT      This document has been electronically signed by Shanti Garrido DO   June 17, 2025 11:07 EDT    Dictated Utilizing Dragon Dictation: Part of this note may be an electronic transcription/translation of spoken language to printed text using the Dragon Dictation System.    Shanti Garrido D.O.  [facility] [place]

## 2025-06-21 LAB — DRUGS UR: NORMAL

## 2025-06-25 ENCOUNTER — OFFICE VISIT (OUTPATIENT)
Dept: ORTHOPEDIC SURGERY | Facility: CLINIC | Age: 35
End: 2025-06-25
Payer: MEDICAID

## 2025-06-25 VITALS
HEIGHT: 63 IN | BODY MASS INDEX: 46.48 KG/M2 | SYSTOLIC BLOOD PRESSURE: 128 MMHG | WEIGHT: 262.35 LBS | DIASTOLIC BLOOD PRESSURE: 84 MMHG

## 2025-06-25 DIAGNOSIS — M25.552 CHRONIC LEFT HIP PAIN: Primary | ICD-10-CM

## 2025-06-25 DIAGNOSIS — G89.29 CHRONIC LEFT HIP PAIN: Primary | ICD-10-CM

## 2025-06-25 PROCEDURE — 1159F MED LIST DOCD IN RCRD: CPT | Performed by: ORTHOPAEDIC SURGERY

## 2025-06-25 PROCEDURE — 1160F RVW MEDS BY RX/DR IN RCRD: CPT | Performed by: ORTHOPAEDIC SURGERY

## 2025-06-25 PROCEDURE — 99213 OFFICE O/P EST LOW 20 MIN: CPT | Performed by: ORTHOPAEDIC SURGERY

## 2025-06-25 NOTE — PROGRESS NOTES
[facility] Note    Subjective     Chief Complaint   Patient presents with    Left Hip - Follow-up     After MRI 05/29/2025        HPI    It has been 7  week(s) since Ms. Jo's last visit. She returns to clinic today for follow-up of left hip pain. The issue has been ongoing for 3 year(s). She rates her pain a 4/10 on the pain scale. Previous/current treatments: physical therapy. Current symptoms: same as prior visit. The pain is worse with any movement of the joint; pain medication and/or NSAID improve the pain. Overall, she is doing better.  Improvement with physical therapy.  Here today for follow-up on her MRI.      I have reviewed the following portions of the patient's history and agree with: History of Present Illness and Review of Systems    Patient Active Problem List   Diagnosis    Depression    Insomnia    PCOS (polycystic ovarian syndrome)    Migraine    Seasonal affective disorder    Environmental and seasonal allergies    Class 3 severe obesity due to excess calories without serious comorbidity with body mass index (BMI) of 45.0 to 49.9 in adult    Chronic pain of multiple joints    Myalgia    NSAID long-term use    Elevated TSH    Well adult exam    Fibromyalgia    Chronic left hip pain    Other fatigue    Encounter for annual physical exam    Degeneration of intervertebral disc of lumbar region with discogenic back pain and lower extremity pain     Past Medical History:   Diagnosis Date    Acromioclavicular separation     there is some thing going on with it, I'm not sure what    Acute pharyngitis     ADHD (attention deficit hyperactivity disorder)     elementary school dx    Anxiety     Depression     Dislocation, shoulder     is it dislocated? maybe?    Eustachian tube dysfunction     Fibromyalgia, primary     Hypertension     Insomnia     Joint pain, knee     Lumbosacral disc disease not sure/by Dr Garrido    L4/l5 like to compress    Migraine     Premenstrual dysphoric  disorder     Upper respiratory infection     Weight gain       Past Surgical History:   Procedure Laterality Date    SALPINGECTOMY Bilateral     TONSILLECTOMY      WISDOM TOOTH EXTRACTION        Family History   Problem Relation Age of Onset    Bipolar disorder Mother     Arthritis Mother     Anxiety disorder Mother     Depression Mother     Irritable bowel syndrome Mother     Endometriosis Mother     LOGAN disease Mother     Cancer Mother         pancreatic cancer    Hyperlipidemia Mother     Mental illness Mother         bipolar/manic depressive    Miscarriages / Stillbirths Mother     Other Mother         chronic pain, ibs, acid reflux, endometriosis    Depression Father     Hypertension Father     Hyperlipidemia Father     Arthritis Father     Asthma Father     Diabetes Father     Mental illness Father         depression    ADD / ADHD Brother     Bipolar disorder Brother     Hepatitis Brother         C    Drug abuse Brother     ADD / ADHD Brother     Drug abuse Brother     Transient ischemic attack Maternal Grandmother     Stroke Maternal Grandmother     Hypertension Maternal Grandmother     Dementia Maternal Grandmother     Osteoporosis Maternal Grandmother     Other Maternal Grandmother         stroke, dementia, hpertension, osteoporosis    Lymphoma Maternal Grandfather     Meniere's disease Maternal Grandfather     Other Maternal Grandfather         non-hodgkins lymphoma    Arthritis Paternal Grandmother     Stroke Paternal Grandmother     Hypertension Paternal Grandmother     Other Paternal Grandmother         blood clot unknown site    Pancreatic cancer Paternal Grandfather     Hypertension Paternal Grandfather     Hyperlipidemia Paternal Grandfather     Diverticulosis Paternal Grandfather     Other Paternal Grandfather         cancer,  of heart attack    Cancer Paternal Grandfather         pancreatic cancer    Developmental Disability Brother         add    Drug abuse Brother     Developmental Disability  Brother         adhd    Drug abuse Brother     Learning disabilities Brother         adhd     Social History     Socioeconomic History    Marital status:    Tobacco Use    Smoking status: Never     Passive exposure: Never    Smokeless tobacco: Never    Tobacco comments:     n/a I smoke weed   Vaping Use    Vaping status: Every Day    Substances: THC (3 gram)    Devices: Disposable    Passive vaping exposure: Yes   Substance and Sexual Activity    Alcohol use: Not Currently     Comment: once or twice a month, if that    Drug use: Yes     Frequency: 7.0 times per week     Types: Marijuana     Comment: smoke/125mg gummy everyday for pain    Sexual activity: Not Currently     Partners: Female, Male     Birth control/protection: Surgical, Implant     Comment: bilateral saplingectomy      Current Outpatient Medications on File Prior to Visit   Medication Sig Dispense Refill    celecoxib (CeleBREX) 200 MG capsule Take 1 capsule by mouth 2 (Two) Times a Day. 60 capsule 5    cyclobenzaprine (FLEXERIL) 10 MG tablet Take 1 tablet by mouth 2 (Two) Times a Day As Needed for Muscle Spasms. 180 tablet 3    DULoxetine (CYMBALTA) 60 MG capsule Take 1 capsule by mouth Daily. 90 capsule 0    Etonogestrel (NEXPLANON SC) Inject  under the skin into the appropriate area as directed. Implanted August 30 of 2023      pregabalin (Lyrica) 50 MG capsule Take 1 capsule by mouth 2 (Two) Times a Day. 60 capsule 2    propranolol (INDERAL) 10 MG tablet Take 1 tablet by mouth 2 (Two) Times a Day. 180 tablet 3    spironolactone (ALDACTONE) 50 MG tablet Take 1 tablet by mouth Daily. 90 tablet 3    SUMAtriptan (Imitrex) 100 MG tablet Take one tablet at onset of headache. May repeat dose one time in 2 hours if headache not relieved. 9 tablet 3    Turmeric 500 MG capsule Take 1 capsule by mouth 2 (Two) Times a Day. 180 capsule 3    vitamin D (ERGOCALCIFEROL) 1.25 MG (25781 UT) capsule capsule Take 1 capsule by mouth 1 (One) Time Per Week. 12  capsule 0     No current facility-administered medications on file prior to visit.      Allergies   Allergen Reactions    Baclofen Hallucinations    Diclofenac Hallucinations    Cephalexin Unknown - Low Severity        Review of Systems   Constitutional:  Negative for activity change, appetite change, chills, diaphoresis, fatigue, fever and unexpected weight change.   HENT:  Negative for congestion, dental problem, drooling, ear discharge, ear pain, facial swelling, hearing loss, mouth sores, nosebleeds, postnasal drip, rhinorrhea, sinus pressure, sneezing, sore throat, tinnitus, trouble swallowing and voice change.    Eyes:  Negative for photophobia, pain, discharge, redness, itching and visual disturbance.   Respiratory:  Negative for apnea, cough, choking, chest tightness, shortness of breath, wheezing and stridor.    Cardiovascular:  Negative for chest pain, palpitations and leg swelling.   Gastrointestinal:  Negative for abdominal distention, abdominal pain, anal bleeding, blood in stool, constipation, diarrhea, nausea, rectal pain and vomiting.   Endocrine: Negative for cold intolerance, heat intolerance, polydipsia, polyphagia and polyuria.   Genitourinary:  Negative for decreased urine volume, difficulty urinating, dysuria, enuresis, flank pain, frequency, genital sores, hematuria and urgency.   Musculoskeletal:  Positive for arthralgias. Negative for back pain, gait problem, joint swelling, myalgias, neck pain and neck stiffness.   Skin:  Negative for color change, pallor, rash and wound.   Allergic/Immunologic: Negative for environmental allergies, food allergies and immunocompromised state.   Neurological:  Negative for dizziness, tremors, seizures, syncope, facial asymmetry, speech difficulty, weakness, light-headedness, numbness and headaches.   Hematological:  Negative for adenopathy. Does not bruise/bleed easily.   Psychiatric/Behavioral:  Negative for agitation, behavioral problems, confusion,  "decreased concentration, dysphoric mood, hallucinations, self-injury, sleep disturbance and suicidal ideas. The patient is not nervous/anxious and is not hyperactive.         Objective      Physical Exam  /84   Ht 160 cm (62.99\")   Wt 119 kg (262 lb 5.6 oz)   BMI 46.48 kg/m²     Body mass index is 46.48 kg/m².         General:   Mental Status:  Alert   Appearance: Cooperative, in no acute distress   Build and Nutrition: Obese by BMI female   Orientation: Alert and oriented to person, place and time   Posture: Normal   Gait: Nonantalgic/normal    Lower Extremity:   Left Hip:    Tenderness:  None    Swelling:  None    Crepitus:  None    Range of motion:  External Rotation: 30°, no pain       Internal Rotation: 30°, no pain       Flexion:  100°       Extension:  0°    Deformities:  None  Functional testing: Negative Stinchfield    No leg length discrepancy      Imaging/Studies  Imaging Results (Last 24 Hours)       ** No results found for the last 24 hours. **          MRI HIP LEFT WO CONTRAST     Date of Exam: 5/29/2025 5:58 PM EDT     Indication: Chronic left hip pain, normal radiographs.     Comparison: 4/25/2025 radiographs     Technique:  Routine multiplanar/multisequence images of the left hip were obtained without contrast administration.          Findings:  There is no evidence of acute fracture or avascular necrosis. Normal pelvic bone alignment. No suspicious bone lesion.     There is a slight irregular appearance of the anterior superior acetabular labrum on series 10 image 18 and series 9 image 13 suggestive of focal tearing. No paralabral cyst formation. The left hip articular cartilage is intact. No significant hip joint   effusion.     Mild right distal gluteal tendinopathy. Otherwise the bilateral gluteal, iliopsoas, adductor, and hamstrings myotendinous structures are intact without significant bursitis or fatty muscle atrophy.     Included intrapelvic structures show no acute abnormality. " Bilateral ovarian//follicles.     The included lumbar spine shows suggestion of mild focal foraminal/extraforaminal narrowing on the left at L5 as seen on series 4 images 5 through 6.     IMPRESSION:  Impression:  1.No acute osseous abnormality of the left hip.  2.Mild right distal gluteal tendinopathy.  3.There is suggestion of mild focal left L5 neural foraminal narrowing.  4.Probable focal tearing of the anterior superior left acetabular labrum. No paralabral cyst formation.           Electronically Signed: Rodolfo Maravilla MD    6/2/2025 7:48 PM EDT    Workstation ID: [id number]    I reviewed the above images, and agree with the findings.  My interpretation is no acute abnormalities, possible labral tear.      Assessment and Plan     Diagnoses and all orders for this visit:    1. Chronic left hip pain (Primary)        1. Chronic left hip pain          I reviewed my findings with the patient.  She does have a possible labral tear seen on MRI.  She has had improvement in her hip with physical therapy, and I would recommend that she continue with physical therapy.  She prefers conservative treatment, and I am in agreement.  We did discuss that an MRI with intra-articular contrast could be obtained, but she does not want to pursue that currently.  She is at higher risk of developing arthritis in her hip in the future.  I will see her back if she has any worsening or problems in the future.  She should continue with physical therapy exercises for maintenance.    Return if symptoms worsen or fail to improve.      Gerald Vidales MD  06/25/25  08:20 EDT    Dictated Utilizing Dragon Dictation

## 2025-07-05 ENCOUNTER — HOSPITAL ENCOUNTER (OUTPATIENT)
Facility: HOSPITAL | Age: 35
Discharge: HOME | End: 2025-07-05
Payer: MEDICAID

## 2025-07-05 DIAGNOSIS — M51.362 DEGENERATION OF INTERVERTEBRAL DISC OF LUMBAR REGION WITH DISCOGENIC BACK PAIN AND LOWER EXTREMITY PAIN: ICD-10-CM

## 2025-07-05 PROCEDURE — 72148 MRI LUMBAR SPINE W/O DYE: CPT

## 2025-07-08 DIAGNOSIS — M54.50 CHRONIC MIDLINE LOW BACK PAIN WITHOUT SCIATICA: Primary | ICD-10-CM

## 2025-07-08 DIAGNOSIS — G89.29 CHRONIC MIDLINE LOW BACK PAIN WITHOUT SCIATICA: Primary | ICD-10-CM

## 2025-07-08 DIAGNOSIS — M51.362 DEGENERATION OF INTERVERTEBRAL DISC OF LUMBAR REGION WITH DISCOGENIC BACK PAIN AND LOWER EXTREMITY PAIN: ICD-10-CM

## 2025-07-21 ENCOUNTER — OFFICE VISIT (OUTPATIENT)
Dept: FAMILY MEDICINE CLINIC | Facility: CLINIC | Age: 35
End: 2025-07-21
Payer: MEDICAID

## 2025-07-21 ENCOUNTER — OFFICE VISIT (OUTPATIENT)
Dept: PAIN MEDICINE | Facility: CLINIC | Age: 35
End: 2025-07-21
Payer: MEDICAID

## 2025-07-21 ENCOUNTER — PATIENT ROUNDING (BHMG ONLY) (OUTPATIENT)
Dept: PAIN MEDICINE | Facility: CLINIC | Age: 35
End: 2025-07-21

## 2025-07-21 VITALS
BODY MASS INDEX: 46.6 KG/M2 | SYSTOLIC BLOOD PRESSURE: 110 MMHG | DIASTOLIC BLOOD PRESSURE: 76 MMHG | HEART RATE: 61 BPM | HEIGHT: 63 IN | TEMPERATURE: 98.4 F | WEIGHT: 263 LBS

## 2025-07-21 VITALS — WEIGHT: 264 LBS | BODY MASS INDEX: 46.78 KG/M2

## 2025-07-21 DIAGNOSIS — M51.362 DEGENERATION OF INTERVERTEBRAL DISC OF LUMBAR REGION WITH DISCOGENIC BACK PAIN AND LOWER EXTREMITY PAIN: ICD-10-CM

## 2025-07-21 DIAGNOSIS — M54.51 VERTEBROGENIC LOW BACK PAIN: ICD-10-CM

## 2025-07-21 DIAGNOSIS — M54.16 LUMBAR RADICULOPATHY: Primary | ICD-10-CM

## 2025-07-21 DIAGNOSIS — G89.4 CHRONIC PAIN SYNDROME: ICD-10-CM

## 2025-07-21 DIAGNOSIS — H66.002 NON-RECURRENT ACUTE SUPPURATIVE OTITIS MEDIA OF LEFT EAR WITHOUT SPONTANEOUS RUPTURE OF TYMPANIC MEMBRANE: Primary | ICD-10-CM

## 2025-07-21 DIAGNOSIS — M79.7 FIBROMYALGIA: ICD-10-CM

## 2025-07-21 PROCEDURE — 1160F RVW MEDS BY RX/DR IN RCRD: CPT | Performed by: FAMILY MEDICINE

## 2025-07-21 PROCEDURE — 1126F AMNT PAIN NOTED NONE PRSNT: CPT | Performed by: FAMILY MEDICINE

## 2025-07-21 PROCEDURE — 1159F MED LIST DOCD IN RCRD: CPT | Performed by: FAMILY MEDICINE

## 2025-07-21 PROCEDURE — 1160F RVW MEDS BY RX/DR IN RCRD: CPT | Performed by: STUDENT IN AN ORGANIZED HEALTH CARE EDUCATION/TRAINING PROGRAM

## 2025-07-21 PROCEDURE — 1159F MED LIST DOCD IN RCRD: CPT | Performed by: STUDENT IN AN ORGANIZED HEALTH CARE EDUCATION/TRAINING PROGRAM

## 2025-07-21 PROCEDURE — 1125F AMNT PAIN NOTED PAIN PRSNT: CPT | Performed by: STUDENT IN AN ORGANIZED HEALTH CARE EDUCATION/TRAINING PROGRAM

## 2025-07-21 PROCEDURE — 99213 OFFICE O/P EST LOW 20 MIN: CPT | Performed by: FAMILY MEDICINE

## 2025-07-21 PROCEDURE — 99204 OFFICE O/P NEW MOD 45 MIN: CPT | Performed by: STUDENT IN AN ORGANIZED HEALTH CARE EDUCATION/TRAINING PROGRAM

## 2025-07-21 RX ORDER — CETIRIZINE HYDROCHLORIDE 10 MG/1
10 TABLET ORAL DAILY
Qty: 30 TABLET | Refills: 5 | Status: SHIPPED | OUTPATIENT
Start: 2025-07-21

## 2025-07-21 NOTE — PROGRESS NOTES
Referring Physician: Shanti Garrido DO  [facility] [address]    Primary Physician: Shanti Garrido DO    CHIEF COMPLAINT or REASON FOR VISIT: Fibromyalgia (Referred by PCP after MRI)      Initial history of present illness on 07/21/2025:  Ms. Nicole Jo is 34 y.o. female who presents as a new patient referral for evaluation and treatment of chronic axial low back pain.  Pain is been present for many years without any inciting event or trauma.  She does have a past medical history of fibromyalgia, PCOS, depression, obesity, and had been having chronic multifocal total body pain for many years without relief until recently beginning pregabalin.  She has noticed marked improvement in her symptoms with pregabalin however her back pain continues to be a struggle.  Pain is present while seated; exacerbated with flexion.  She did previously have some left lower extremity/thigh numbness and tingling which resolved with pregabalin.  She has tried physical therapy.  She has tried NSAIDs.  Patient denies any new onset bowel or bladder dysfunction, lower extremity weakness, saddle anesthesia or unexplained weight loss.       Interval history:    Interventions:      Objective Pain Scoring:   BRIEF PAIN INVENTORY:  Total score:   Pain Score    07/21/25 0811   PainSc: 3    PainLoc: Generalized      PHQ-2: 4  PHQ-9: 16  Opioid Risk Tool:         Review of Systems:   ROS negative except as otherwise noted     Past Medical History:   Past Medical History:   Diagnosis Date    Acromioclavicular separation     there is some thing going on with it, I'm not sure what    Acute pharyngitis     ADHD (attention deficit hyperactivity disorder)     elementary school dx    Anxiety     Cervical disc disorder     Chronic pain disorder     Depression     Dislocation, shoulder     is it dislocated? maybe?    Eustachian tube dysfunction     Fibromyalgia, primary     Hypertension     Insomnia     Joint pain, knee      Lumbosacral disc disease not sure/by Dr Garrido    L4/l5 like to compress    Migraine     Premenstrual dysphoric disorder     Upper respiratory infection     Weight gain          Past Surgical History:   Past Surgical History:   Procedure Laterality Date    SALPINGECTOMY Bilateral     TONSILLECTOMY      WISDOM TOOTH EXTRACTION           Family History   Family History   Problem Relation Age of Onset    Bipolar disorder Mother     Arthritis Mother     Anxiety disorder Mother     Depression Mother     Irritable bowel syndrome Mother     Endometriosis Mother     LOGAN disease Mother     Cancer Mother         pancreatic cancer    Hyperlipidemia Mother     Mental illness Mother         bipolar/manic depressive    Miscarriages / Stillbirths Mother     Other Mother         chronic pain, ibs, acid reflux, endometriosis    Depression Father     Hypertension Father     Hyperlipidemia Father     Arthritis Father     Asthma Father     Diabetes Father     Mental illness Father         depression    ADD / ADHD Brother     Bipolar disorder Brother     Hepatitis Brother         C    Drug abuse Brother     ADD / ADHD Brother     Drug abuse Brother     Transient ischemic attack Maternal Grandmother     Stroke Maternal Grandmother     Hypertension Maternal Grandmother     Dementia Maternal Grandmother     Osteoporosis Maternal Grandmother     Other Maternal Grandmother         stroke, dementia, hpertension, osteoporosis    Lymphoma Maternal Grandfather     Meniere's disease Maternal Grandfather     Other Maternal Grandfather         non-hodgkins lymphoma    Arthritis Paternal Grandmother     Stroke Paternal Grandmother     Hypertension Paternal Grandmother     Other Paternal Grandmother         blood clot unknown site    Pancreatic cancer Paternal Grandfather     Hypertension Paternal Grandfather     Hyperlipidemia Paternal Grandfather     Diverticulosis Paternal Grandfather     Other Paternal Grandfather         cancer,  of heart  attack    Cancer Paternal Grandfather         pancreatic cancer    Developmental Disability Brother         add    Drug abuse Brother     Developmental Disability Brother         adhd    Drug abuse Brother     Learning disabilities Brother         adhd         Social History   Social History     Socioeconomic History    Marital status:    Tobacco Use    Smoking status: Never     Passive exposure: Never    Smokeless tobacco: Never    Tobacco comments:     n/a I smoke weed   Vaping Use    Vaping status: Every Day    Substances: THC (3 gram)    Devices: Disposable    Passive vaping exposure: Yes   Substance and Sexual Activity    Alcohol use: Not Currently     Comment: once or twice a month, if that    Drug use: Yes     Frequency: 7.0 times per week     Types: Marijuana     Comment: smoke/125mg gummy everyday for pain    Sexual activity: Not Currently     Partners: Female, Male     Birth control/protection: Bilateral salpingectomy , Surgical, Implant     Comment: bilateral saplingectomy        Medications:     Current Outpatient Medications:     celecoxib (CeleBREX) 200 MG capsule, Take 1 capsule by mouth 2 (Two) Times a Day., Disp: 60 capsule, Rfl: 5    cyclobenzaprine (FLEXERIL) 10 MG tablet, Take 1 tablet by mouth 2 (Two) Times a Day As Needed for Muscle Spasms., Disp: 180 tablet, Rfl: 3    DULoxetine (CYMBALTA) 60 MG capsule, Take 1 capsule by mouth Daily., Disp: 90 capsule, Rfl: 0    Etonogestrel (NEXPLANON SC), Inject  under the skin into the appropriate area as directed. Implanted August 30 of 2023, Disp: , Rfl:     pregabalin (Lyrica) 50 MG capsule, Take 1 capsule by mouth 2 (Two) Times a Day., Disp: 60 capsule, Rfl: 2    propranolol (INDERAL) 10 MG tablet, Take 1 tablet by mouth 2 (Two) Times a Day., Disp: 180 tablet, Rfl: 3    spironolactone (ALDACTONE) 50 MG tablet, Take 1 tablet by mouth Daily., Disp: 90 tablet, Rfl: 3    SUMAtriptan (Imitrex) 100 MG tablet, Take one tablet at onset of headache. May  repeat dose one time in 2 hours if headache not relieved., Disp: 9 tablet, Rfl: 3    Turmeric 500 MG capsule, Take 1 capsule by mouth 2 (Two) Times a Day., Disp: 180 capsule, Rfl: 3    vitamin D (ERGOCALCIFEROL) 1.25 MG (68199 UT) capsule capsule, Take 1 capsule by mouth 1 (One) Time Per Week., Disp: 12 capsule, Rfl: 0        Physical Exam:     Vitals:    07/21/25 0811   Weight: 120 kg (264 lb)   PainSc: 3    PainLoc: Generalized        General: Alert and oriented, No acute distress.   HEENT: Normocephalic, atraumatic.   Cardiovascular: No gross edema.  Obese  Respiratory: Respirations are non-labored     Lumbar Spine:   No masses or atrophy  Range of motion - Flexion normal but painful. Extension normal.    Facet Loading: Negative bilaterally  Facet Palpation - Nontender   Dorothea finger/Gaenslen's/Michael's/PAULA/Thigh thrust -   Straight leg raise/slump test:    Multifidus toe-touch test:    Motor Exam:     Strength: Rate on 1-5 scale Right Left    L1/2- hip flexion 5/5  5/5    L3- knee extension 5/5  5/5    L4- ankle dorsiflexion 5/5  5/5    L5- great toe extension 5/5  5/5    S1- ankle plantarflexion 5/5  5/5    Sensory Exam: Full and equal sensation to light touch throughout.       Neurologic: Cranial Nerves II-XII are grossly intact.      Psychiatric: Cooperative.   Gait: Normal   Assistive Devices: None    Imaging Studies:   Results for orders placed during the hospital encounter of 07/05/25    MRI Lumbar Spine Without Contrast    Narrative  MRI LUMBAR SPINE WO CONTRAST    Date of Exam: 7/5/2025 12:36 PM EDT    Indication: L5 Neural Foraminal Narrowing.    Comparison: None available.    Technique:  Routine multiplanar/multisequence sequence images of the lumbar spine were obtained without contrast administration.    Findings:  Prominent spondylotic endplate changes are present at L4-5 with mild component of marrow edema. T1 marrow signal is otherwise preserved, without evidence of fracture or suspicious  marrow replacing lesion. Straightening is present, without evidence of  significant listhesis or subluxation. The conus medullaris and cauda equina nerve roots are satisfactory in appearance. Prominent epidural lipomatosis is present. The paraspinal soft tissues demonstrate no acute or suspicious findings. Multilevel  spondylosis is present, with areas of involvement including    L1-2, no significant spinal canal or neuroforaminal impingement.    L2-3, no significant spinal canal or neuroforaminal impingement.    L3-4, circumferential prominent epidural fat results in mild effacement of the thecal sac. There is otherwise no significant spinal canal or neuroforaminal impingement.    L4-5, small circumferential disc bulge in addition to prominent epidural fat which circumferentially effaces the thecal sac. There is also bilateral facet arthropathy. The spinal canal is minimally narrowed. There is component of small right-sided disc  protrusion mildly narrowing the right L4-5 neural foramen.    L5-S1, minimal disc bulge and bilateral facet arthropathy. There is component of lateralized left disc protrusion resulting in mild to moderate narrowing of the left neural foramen.    Impression  Impression:  Generally mild multilevel discogenic spondylosis with contributing component of epidural lipomatosis, with prominent epidural fat resulting in circumferential effacement of the thecal sac at multiple levels. As previously mentioned there is a small  left-sided disc protrusion which results in mild to moderate narrowing of the left neural foramen at L5-S1.    Electronically Signed: Wilber Epperson MD  7/8/2025 3:44 PM EDT  Workstation ID: [id number]        07/21/2025  Independent review of radiographic imaging: Available for my interpretation of the lumbar MRI dated July 5, 2025 demonstrating L4/L5 degenerative disc disease with fatty and edematous endplate changes (Modic 1 and Modic 2).  There is a broad L4/L5 disc bulge with  central posterior caudal extrusion which contributes to canal stenosis.  There is epidural lipomatosis as the primary cause of thecal sac narrowing.    Impression & Plan:       07/21/2025: Nicole Jo is a 34 y.o. female with past medical history significant for depression, insomnia, PCOS, seasonal affective disorder, class III severe obesity, myalgia, NSAID long-term use, fibromyalgia, ADHD, anxiety who presents to the pain clinic for evaluation and treatment of chronic low back pain.  MRI interpretation as above.  Evaluation consistent with lumbar radiculopathy, vertebrogenic low back pain.  We discussed epidural steroid injection to improve pain.  If greater than 50% relief for at least 2-3 months can consider repeat as needed every 3 to 4 months.  I had a discussion with the patient regarding the risks of the procedure including bleeding, infection, damage to surrounding structures.  We discussed the potential adverse effects of corticosteroid injection including flushing of the face, lipodystrophy, skin discoloration, elevated blood glucose, increased blood pressure.  Risks of frequent steroid administration include weight gain, hormonal changes, mood changes, osteoporosis.    We additionally discussed L4 and L5 basivertebral nerve ablation for vertebrogenic low back pain.    1. Lumbar radiculopathy    2. Vertebrogenic low back pain    3. Fibromyalgia    4. Chronic pain syndrome        PLAN:  1. Medications:    - Agree with pregabalin    2. Physical Therapy: Continue HEP    3. Psychological: defer    4. Complementary and alternative (CAM) Therapies:     5. Labs/Diagnostic studies: None indicated     6. Imaging: MRI independently interpreted and reviewed with patient    7. Interventions: Schedule lumbar interlaminar epidural steroid injection (possible L3/L4).  Consider L4, L5 basivertebral nerve ablation    8. Referrals: None indicated     9. Records: Reviewed PCP note    10. Lifestyle goals:    Follow-up 1  [facility]  Romel Goel MD          Quality Metrics:                Please note that portions of this note were completed with a voice recognition program.      Any copied data in any portion of my note from previous notes included in the HPI, PE, MDM and/or assessment and plan has been reviewed by myself and accurate as of this date.      The 21st Century Cures Act makes medical notes like this available to patients in the interest of transparency. This is a medical document intended as peer to peer communication. It is written in medical language and may contain abbreviations or verbiage that are unfamiliar. It may appear blunt or direct. Medical documents are intended to carry relevant information, facts as evident, and the clinical opinion of the practitioner.

## 2025-07-21 NOTE — PROGRESS NOTES
My name is Gabi.        I would like to officially welcome you to our practice and ask about your recent visit.           If you could tell me about your recent visit with us. What things went well?           We are always looking for ways to make our patients experience even better. Do you have any recommendations on ways we may improve?           How well did the staff protect your safety? (By washing hands, using hand , wearing I.D., etc.)           Overall, were you satisfied with your visit to our practice?           I appreciate your time and feedback.    Gabi Samuel  Patient Access

## 2025-07-21 NOTE — PROGRESS NOTES
A MY CHART MESSAGE HAS BEEN SENT TO THE PATIENT FOR PATIENT ROUNDING WITH [facility] MY CHART MESSAGE HAS BEEN SENT TO THE PATIENT FOR PATIENT ROUNDING WITH [facility] [place] [facility]  
RW/fair plus
poor balance

## 2025-07-21 NOTE — PROGRESS NOTES
Subjective     Chief Complaint  Earache (L ear)    Subjective          Nicole Jo is a 34 y.o. female who presents today to [facility] for initial evaluation .    HPI:   Earache      History of Present Illness  The patient is a 34-year-old female who presents for evaluation of ear pain.    She has been experiencing ear pain for the past 1 to 2 weeks, which she attributes to water entering her ears during a swimming session. While she managed to drain one ear, the other remained filled with water, leading to discomfort. She reports no fever but mentions intermittent earaches and a sensation of fluid in her left ear. She also reports a feeling of fullness and pressure in the affected ear. Additionally, she has been sniffling frequently. She has not been taking any antihistamines. She has not noticed any yeast infections associated with antibiotic use, except for a single instance of a yeast infection in her belly button.    She saw the pain clinic today. They suggested trying an injection and seeing how it feels after a month. Nerve ablation was also suggested, but she would rather hold off on that as minimally invasive as it is. She will stick with the nonsurgical options first.    FAMILY HISTORY  Her father  unexpectedly, but the cause is unknown.      The following portions of the patient's history were reviewed and updated as appropriate: allergies, current medications, past family history, past medical history, past social history, past surgical history and problem list.    Objective     Objective     Allergy:   Allergies   Allergen Reactions    Baclofen Hallucinations    Diclofenac Hallucinations    Cephalexin Unknown - Low Severity        Current Medications:   Current Outpatient Medications   Medication Sig Dispense Refill    celecoxib (CeleBREX) 200 MG capsule Take 1 capsule by mouth 2 (Two) Times a Day. 60 capsule 5    cyclobenzaprine (FLEXERIL) 10 MG tablet  Take 1 tablet by mouth 2 (Two) Times a Day As Needed for Muscle Spasms. 180 tablet 3    DULoxetine (CYMBALTA) 60 MG capsule Take 1 capsule by mouth Daily. 90 capsule 0    Etonogestrel (NEXPLANON SC) Inject  under the skin into the appropriate area as directed. Implanted August 30 of 2023      pregabalin (Lyrica) 50 MG capsule Take 1 capsule by mouth 2 (Two) Times a Day. 60 capsule 2    propranolol (INDERAL) 10 MG tablet Take 1 tablet by mouth 2 (Two) Times a Day. 180 tablet 3    spironolactone (ALDACTONE) 50 MG tablet Take 1 tablet by mouth Daily. 90 tablet 3    SUMAtriptan (Imitrex) 100 MG tablet Take one tablet at onset of headache. May repeat dose one time in 2 hours if headache not relieved. 9 tablet 3    Turmeric 500 MG capsule Take 1 capsule by mouth 2 (Two) Times a Day. 180 capsule 3    vitamin D (ERGOCALCIFEROL) 1.25 MG (34776 UT) capsule capsule Take 1 capsule by mouth 1 (One) Time Per Week. 12 capsule 0    amoxicillin-clavulanate (AUGMENTIN) 875-125 MG per tablet Take 1 tablet by mouth 2 (Two) Times a Day. 14 tablet 0    cetirizine (zyrTEC) 10 MG tablet Take 1 tablet by mouth Daily. 30 tablet 5     No current facility-administered medications for this visit.       Past Medical History:  Past Medical History:   Diagnosis Date    Acromioclavicular separation     there is some thing going on with it, I'm not sure what    Acute pharyngitis     ADHD (attention deficit hyperactivity disorder)     elementary school dx    Anxiety     Cervical disc disorder     Chronic pain disorder     Depression     Dislocation, shoulder     is it dislocated? maybe?    Eustachian tube dysfunction     Fibromyalgia, primary     Hypertension     Insomnia     Joint pain, knee     Lumbosacral disc disease not sure/by Dr Garrido    L4/l5 like to compress    Migraine     Premenstrual dysphoric disorder     Upper respiratory infection     Weight gain        Past Surgical History:  Past Surgical History:   Procedure Laterality Date     SALPINGECTOMY Bilateral     TONSILLECTOMY      WISDOM TOOTH EXTRACTION         Social History:  Social History     Socioeconomic History    Marital status:    Tobacco Use    Smoking status: Never     Passive exposure: Never    Smokeless tobacco: Never    Tobacco comments:     n/a I smoke weed   Vaping Use    Vaping status: Every Day    Substances: THC (3 gram)    Devices: Disposable    Passive vaping exposure: Yes   Substance and Sexual Activity    Alcohol use: Not Currently     Comment: once or twice a month, if that    Drug use: Yes     Frequency: 7.0 times per week     Types: Marijuana     Comment: smoke/125mg gummy everyday for pain    Sexual activity: Not Currently     Partners: Female, Male     Birth control/protection: Bilateral salpingectomy , Surgical, Implant     Comment: bilateral saplingectomy       Family History:  Family History   Problem Relation Age of Onset    Bipolar disorder Mother     Arthritis Mother     Anxiety disorder Mother     Depression Mother     Irritable bowel syndrome Mother     Endometriosis Mother     LOGAN disease Mother     Cancer Mother         pancreatic cancer    Hyperlipidemia Mother     Mental illness Mother         bipolar/manic depressive    Miscarriages / Stillbirths Mother     Other Mother         chronic pain, ibs, acid reflux, endometriosis    Depression Father     Hypertension Father     Hyperlipidemia Father     Arthritis Father     Asthma Father     Diabetes Father     Mental illness Father         depression    ADD / ADHD Brother     Bipolar disorder Brother     Hepatitis Brother         C    Drug abuse Brother     ADD / ADHD Brother     Drug abuse Brother     Developmental Disability Brother         add    Drug abuse Brother     Developmental Disability Brother         adhd    Drug abuse Brother     Learning disabilities Brother         adhd    Transient ischemic attack Maternal Grandmother     Stroke Maternal Grandmother     Hypertension Maternal Grandmother   "   Dementia Maternal Grandmother     Osteoporosis Maternal Grandmother     Other Maternal Grandmother         stroke, dementia, hpertension, osteoporosis    Lymphoma Maternal Grandfather     Meniere's disease Maternal Grandfather     Other Maternal Grandfather         non-hodgkins lymphoma    Arthritis Paternal Grandmother     Stroke Paternal Grandmother     Hypertension Paternal Grandmother     Other Paternal Grandmother         blood clot unknown site    Pancreatic cancer Paternal Grandfather     Hypertension Paternal Grandfather     Hyperlipidemia Paternal Grandfather     Diverticulosis Paternal Grandfather     Other Paternal Grandfather         cancer,  of heart attack    Cancer Paternal Grandfather         pancreatic cancer         Vital Signs:   /76   Pulse 61   Temp 98.4 °F (36.9 °C) (Infrared)   Ht 160 cm (62.99\")   Wt 119 kg (263 lb)   BMI 46.60 kg/m²      Physical Exam:  Physical Exam  Vitals reviewed.   Constitutional:       Appearance: She is not ill-appearing.   HENT:      Right Ear: A middle ear effusion is present.      Left Ear: Tenderness present. A middle ear effusion is present. Tympanic membrane is erythematous.   Eyes:      Pupils: Pupils are equal, round, and reactive to light.   Cardiovascular:      Rate and Rhythm: Normal rate.      Pulses: Normal pulses.   Pulmonary:      Effort: Pulmonary effort is normal.      Breath sounds: Normal breath sounds.   Neurological:      General: No focal deficit present.      Mental Status: She is alert. Mental status is at baseline.               PHQ-9 Score  PHQ-9 Total Score:      Lab Review  Office Visit on 2025   Component Date Value Ref Range Status    Report Summary 2025 FINAL   Final    Comment: ====================================================================  [facility]UG [facility]NALYS[facility]  ====================================================================  Test                             Result       Flag       " Units  Drug Present and Declared for Prescription Verification    Cyclobenzaprine                PRESENT      EXPECTED    Desmethylcyclobenzaprine       PRESENT      EXPECTED     Desmethylcyclobenzaprine is an expected metabolite of     cyclobenzaprine.    Duloxetine                     PRESENT      EXPECTED    Propranolol                    PRESENT      EXPECTED  Drug Present not Declared for Prescription Verification    Carboxy-THC                    >481         UNEXPECTED ng/mg creat     Carboxy-THC is a metabolite of tetrahydrocannabinol (THC). Source     of THC is most commonly herbal marijuana or marijuana-based     products, but THC is also present in a scheduled prescription     medication. Trace amounts of THC can be present in hemp and     cannabidi                           ol (CBD) products. This test is not intended to     distinguish between delta-9-tetrahydrocannabinol, the predominant     form of THC in most herbal or marijuana-based products, and     delta-8-tetrahydrocannabinol.  Drug Absent but Declared for Prescription Verification    Pregabalin                     Not Detected UNEXPECTED  ====================================================================  Test                      Result    Flag   Units      Ref Range    Creatinine              208              mg/dL      >=20  ====================================================================  Declared Medications:   The flagging and interpretation on this report are based on the   following declared medications.  Unexpected results may arise from   inaccuracies in the declared medications.   **Note: The testing scope of this panel includes these medications:   Cyclobenzaprine (Flexeril)   Duloxetine (Cymbalta)   Pregabalin (Lyrica)   Propranolol (Inderal)   **Note: The testing scope of thi                           s panel does not include following   reported medications:   Celecoxib (Celebrex)   Etonogestrel   Spironolactone  (Aldactone)   Sumatriptan (Imitrex)   Turmeric   Vitamin D2 (Ergocalciferol)  ====================================================================  For clinical consultation, please call [phone].  ====================================================================      Hemoglobin A1C 06/17/2025 5.50  4.80 - 5.60 % Final    Total Cholesterol 06/17/2025 183  0 - 200 mg/dL Final    Triglycerides 06/17/2025 52  0 - 150 mg/dL Final    HDL Cholesterol 06/17/2025 36 (L)  40 - 60 mg/dL Final    LDL Cholesterol  06/17/2025 137 (H)  0 - 100 mg/dL Final    VLDL Cholesterol 06/17/2025 10  5 - 40 mg/dL Final    LDL/HDL Ratio 06/17/2025 3.79   Final    Vitamin B-12 06/17/2025 265  211 - 946 pg/mL Final    25 Hydroxy, Vitamin D 06/17/2025 31.1  30.0 - 100.0 ng/ml Final   Lab on 04/25/2025   Component Date Value Ref Range Status    WBC 04/25/2025 7.64  3.40 - 10.80 10*3/mm3 Final    RBC 04/25/2025 4.79  3.77 - 5.28 10*6/mm3 Final    Hemoglobin 04/25/2025 13.6  12.0 - 15.9 g/dL Final    Hematocrit 04/25/2025 42.6  34.0 - 46.6 % Final    MCV 04/25/2025 88.9  79.0 - 97.0 fL Final    MCH 04/25/2025 28.4  26.6 - 33.0 pg Final    MCHC 04/25/2025 31.9  31.5 - 35.7 g/dL Final    RDW 04/25/2025 13.5  12.3 - 15.4 % Final    RDW-SD 04/25/2025 43.4  37.0 - 54.0 fl Final    MPV 04/25/2025 10.6  6.0 - 12.0 fL Final    Platelets 04/25/2025 416  140 - 450 10*3/mm3 Final    Neutrophil % 04/25/2025 62.5  42.7 - 76.0 % Final    Lymphocyte % 04/25/2025 26.4  19.6 - 45.3 % Final    Monocyte % 04/25/2025 8.9  5.0 - 12.0 % Final    Eosinophil % 04/25/2025 1.4  0.3 - 6.2 % Final    Basophil % 04/25/2025 0.5  0.0 - 1.5 % Final    Immature Grans % 04/25/2025 0.3  0.0 - 0.5 % Final    Neutrophils, Absolute 04/25/2025 4.77  1.70 - 7.00 10*3/mm3 Final    Lymphocytes, Absolute 04/25/2025 2.02  0.70 - 3.10 10*3/mm3 Final    Monocytes, Absolute 04/25/2025 0.68  0.10 - 0.90 10*3/mm3 Final    Eosinophils, Absolute 04/25/2025 0.11  0.00 - 0.40 10*3/mm3 Final     Basophils, Absolute 04/25/2025 0.04  0.00 - 0.20 10*3/mm3 Final    Immature Grans, Absolute 04/25/2025 0.02  0.00 - 0.05 10*3/mm3 Final    nRBC 04/25/2025 0.0  0.0 - 0.2 /100 WBC Final    C-Reactive Protein 04/25/2025 0.52 (H)  0.00 - 0.50 mg/dL Final    Glucose 04/25/2025 91  65 - 99 mg/dL Final    BUN 04/25/2025 7  6 - 20 mg/dL Final    Creatinine 04/25/2025 0.84  0.57 - 1.00 mg/dL Final    Sodium 04/25/2025 140  136 - 145 mmol/L Final    Potassium 04/25/2025 4.1  3.5 - 5.2 mmol/L Final    Chloride 04/25/2025 105  98 - 107 mmol/L Final    CO2 04/25/2025 25.0  22.0 - 29.0 mmol/L Final    Calcium 04/25/2025 9.0  8.6 - 10.5 mg/dL Final    Total Protein 04/25/2025 7.6  6.0 - 8.5 g/dL Final    Albumin 04/25/2025 4.3  3.5 - 5.2 g/dL Final    ALT (SGPT) 04/25/2025 23  1 - 33 U/L Final    AST (SGOT) 04/25/2025 23  1 - 32 U/L Final    Alkaline Phosphatase 04/25/2025 85  39 - 117 U/L Final    Total Bilirubin 04/25/2025 0.3  0.0 - 1.2 mg/dL Final    Globulin 04/25/2025 3.3  gm/dL Final    A/G Ratio 04/25/2025 1.3  g/dL Final    BUN/Creatinine Ratio 04/25/2025 8.3  7.0 - 25.0 Final    Anion Gap 04/25/2025 10.0  5.0 - 15.0 mmol/L Final    eGFR 04/25/2025 93.1  >60.0 mL/min/1.73 Final    Sed Rate 04/25/2025 29 (H)  0 - 20 mm/hr Final    Vitamin B-12 04/25/2025 239  211 - 946 pg/mL Final    25 Hydroxy, Vitamin D 04/25/2025 14.0 (L)  30.0 - 100.0 ng/ml Final    Hepatitis B Surface Ag 04/25/2025 Non-Reactive  Non-Reactive Final    Hep A IgM 04/25/2025 Non-Reactive  Non-Reactive Final    Hep B C IgM 04/25/2025 Non-Reactive  Non-Reactive Final    Hepatitis C Ab 04/25/2025 Non-Reactive  Non-Reactive Final    TSH 04/25/2025 2.570  0.270 - 4.200 uIU/mL Final    Cortisol 04/25/2025 3.37    mcg/dL Final        Radiology Results  MRI Lumbar Spine Without Contrast  Result Date: 7/8/2025  Impression: Impression: Generally mild multilevel discogenic spondylosis with contributing component of epidural lipomatosis, with prominent epidural  fat resulting in circumferential effacement of the thecal sac at multiple levels. As previously mentioned there is a small left-sided disc protrusion which results in mild to moderate narrowing of the left neural foramen at L5-S1. Electronically Signed: Wilber Epperson MD  7/8/2025 3:44 PM EDT  Workstation ID: [id number]       Assessment / Plan         Assessment and Plan   Diagnoses and all orders for this visit:    1. Non-recurrent acute suppurative otitis media of left ear without spontaneous rupture of tympanic membrane (Primary)  -     cetirizine (zyrTEC) 10 MG tablet; Take 1 tablet by mouth Daily.  Dispense: 30 tablet; Refill: 5  -     amoxicillin-clavulanate (AUGMENTIN) 875-125 MG per tablet; Take 1 tablet by mouth 2 (Two) Times a Day.  Dispense: 14 tablet; Refill: 0    2. Degeneration of intervertebral disc of lumbar region with discogenic back pain and lower extremity pain        Assessment & Plan  1. Left ear pain.  - Symptoms include ear pain, fullness, pressure, and sniffling.  - Physical exam shows fluid and mild redness in the left ear.  - Discussed the possibility of allergy symptoms and the need for antihistamines.  - Prescribed antibiotics for the left ear; advised to contact the office if a yeast infection develops.    2. Pain management.  - Continues to experience pain and is currently taking Lyrica and gabapentin.  - An injection will be administered, and its effectiveness will be evaluated after one month.  - Nerve ablation was suggested but will be considered later if necessary.  - Patient prefers to try nonsurgical options first.    Follow-up: The patient will follow up in a couple of months.      Discussed possible differential diagnoses, testing, treatment, recommended non-pharmacological interventions, risks, warning signs to monitor for that would indicate need for follow-up in clinic or ER. If no improvement with these regimens or you have new or worsening symptoms follow-up. Patient  verbalizes understanding and agreement with plan of care. Denies further needs or concerns.     Patient was given instructions and counseling regarding her condition and for health maintenance advised.           Health Maintenance  Health Maintenance: There are no preventive care reminders to display for this patient.     Meds ordered during this visit  New Medications Ordered This Visit   Medications    cetirizine (zyrTEC) 10 MG tablet     Sig: Take 1 tablet by mouth Daily.     Dispense:  30 tablet     Refill:  5    amoxicillin-clavulanate (AUGMENTIN) 875-125 MG per tablet     Sig: Take 1 tablet by mouth 2 (Two) Times a Day.     Dispense:  14 tablet     Refill:  0       Meds stopped during this visit:  There are no discontinued medications.     Visit Diagnoses    ICD-10-CM ICD-9-CM   1. Non-recurrent acute suppurative otitis media of left ear without spontaneous rupture of tympanic membrane  H66.002 382.00   2. Degeneration of intervertebral disc of lumbar region with discogenic back pain and lower extremity pain  M51.362 722.52       Patient was given instructions and counseling regarding her condition or for health maintenance advice. Please see specific information pulled into the AVS if appropriate.     Follow Up   Return for Next scheduled follow up.      Patient or patient representative verbalized consent for the use of Ambient Listening during the visit with  Shanti Garrido DO for chart documentation. 7/21/2025  13:28 EDT      This document has been electronically signed by Shanti Garrido DO   July 21, 2025 14:35 EDT    Dictated Utilizing Dragon Dictation: Part of this note may be an electronic transcription/translation of spoken language to printed text using the Dragon Dictation System.    Shanti Garrido D.O.  [facility] [place]

## 2025-08-08 ENCOUNTER — OUTSIDE FACILITY SERVICE (OUTPATIENT)
Dept: PAIN MEDICINE | Facility: CLINIC | Age: 35
End: 2025-08-08
Payer: MEDICAID

## 2025-08-08 ENCOUNTER — DOCUMENTATION (OUTPATIENT)
Dept: PAIN MEDICINE | Facility: CLINIC | Age: 35
End: 2025-08-08

## 2025-08-12 ENCOUNTER — TELEPHONE (OUTPATIENT)
Dept: FAMILY MEDICINE CLINIC | Facility: CLINIC | Age: 35
End: 2025-08-12
Payer: MEDICAID

## 2025-08-14 ENCOUNTER — PATIENT MESSAGE (OUTPATIENT)
Dept: PAIN MEDICINE | Facility: CLINIC | Age: 35
End: 2025-08-14
Payer: MEDICAID

## 2025-08-18 ENCOUNTER — TELEPHONE (OUTPATIENT)
Dept: FAMILY MEDICINE CLINIC | Facility: CLINIC | Age: 35
End: 2025-08-18
Payer: MEDICAID